# Patient Record
Sex: FEMALE | Race: WHITE | NOT HISPANIC OR LATINO | Employment: FULL TIME | URBAN - NONMETROPOLITAN AREA
[De-identification: names, ages, dates, MRNs, and addresses within clinical notes are randomized per-mention and may not be internally consistent; named-entity substitution may affect disease eponyms.]

---

## 2017-02-02 ENCOUNTER — OFFICE VISIT - GICH (OUTPATIENT)
Dept: FAMILY MEDICINE | Facility: OTHER | Age: 52
End: 2017-02-02

## 2017-02-02 ENCOUNTER — AMBULATORY - GICH (OUTPATIENT)
Dept: SCHEDULING | Facility: OTHER | Age: 52
End: 2017-02-02

## 2017-02-02 DIAGNOSIS — Z71.3 DIETARY COUNSELING AND SURVEILLANCE: ICD-10-CM

## 2017-02-02 DIAGNOSIS — E66.9 OBESITY: ICD-10-CM

## 2017-03-10 ENCOUNTER — OFFICE VISIT - GICH (OUTPATIENT)
Dept: FAMILY MEDICINE | Facility: OTHER | Age: 52
End: 2017-03-10

## 2017-03-10 DIAGNOSIS — Z71.3 DIETARY COUNSELING AND SURVEILLANCE: ICD-10-CM

## 2017-10-02 ENCOUNTER — COMMUNICATION - GICH (OUTPATIENT)
Dept: FAMILY MEDICINE | Facility: OTHER | Age: 52
End: 2017-10-02

## 2017-10-02 DIAGNOSIS — Z71.3 DIETARY COUNSELING AND SURVEILLANCE: ICD-10-CM

## 2017-11-03 ENCOUNTER — HOSPITAL ENCOUNTER (OUTPATIENT)
Dept: RADIOLOGY | Facility: OTHER | Age: 52
End: 2017-11-03
Attending: FAMILY MEDICINE

## 2017-11-03 ENCOUNTER — HISTORY (OUTPATIENT)
Dept: RADIOLOGY | Facility: OTHER | Age: 52
End: 2017-11-03

## 2017-11-03 ENCOUNTER — AMBULATORY - GICH (OUTPATIENT)
Dept: FAMILY MEDICINE | Facility: OTHER | Age: 52
End: 2017-11-03

## 2017-11-03 DIAGNOSIS — L72.9 FOLLICULAR CYST OF SKIN AND SUBCUTANEOUS TISSUE: ICD-10-CM

## 2017-11-03 DIAGNOSIS — Z12.31 ENCOUNTER FOR SCREENING MAMMOGRAM FOR MALIGNANT NEOPLASM OF BREAST: ICD-10-CM

## 2017-11-06 ENCOUNTER — AMBULATORY - GICH (OUTPATIENT)
Dept: RADIOLOGY | Facility: OTHER | Age: 52
End: 2017-11-06

## 2017-11-06 DIAGNOSIS — R92.8 OTHER ABNORMAL AND INCONCLUSIVE FINDINGS ON DIAGNOSTIC IMAGING OF BREAST: ICD-10-CM

## 2017-11-10 ENCOUNTER — HOSPITAL ENCOUNTER (OUTPATIENT)
Dept: RADIOLOGY | Facility: OTHER | Age: 52
End: 2017-11-10
Attending: FAMILY MEDICINE

## 2017-11-10 DIAGNOSIS — R92.8 OTHER ABNORMAL AND INCONCLUSIVE FINDINGS ON DIAGNOSTIC IMAGING OF BREAST: ICD-10-CM

## 2017-12-27 NOTE — PROGRESS NOTES
Patient Information     Patient Name MRN Sex Bertha Alston [id number] Female 1965      Progress Notes by Meka Vilchis R.T. (ARRT) at 11/10/2017 12:25 PM     Author:  Meka Vilchis R.T. (ARRT) Service:  (none) Author Type:  (none)     Filed:  11/10/2017 12:26 PM Date of Service:  11/10/2017 12:25 PM Status:  Signed     :  Meka Vilchis R.T. (MELANIET) (Formerly [facility] - Registered Radiologic Technologist)            Falls Risk Criteria:    Age 65 and older or under age 4        Sensory deficits    Poor vision    Use of ambulatory aides    Impaired judgment    Unable to walk independently    Meets High Risk criteria for falls:  no
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
## 2017-12-28 NOTE — PROGRESS NOTES
Patient Information     Patient Name MRN Sex Bertha Alston [id number] Female 1965      Progress Notes by Cathi Benson at 11/3/2017 11:16 AM     Author:  Cathi Benson Service:  (none) Author Type:  (none)     Filed:  11/3/2017 11:16 AM Date of Service:  11/3/2017 11:16 AM Status:  Signed     :  Cathi Benson            Falls Risk Criteria:    Age 65 and older or under age 4        Sensory deficits    Poor vision    Use of ambulatory aides    Impaired judgment    Unable to walk independently    Meets High Risk criteria for falls:  no

## 2017-12-30 NOTE — NURSING NOTE
Patient Information     Patient Name MRN Bertha Quinones [id number] Female 1965      Nursing Note by Colette Ngo at 11/3/2017  3:45 PM     Author:  Colette Ngo Service:  (none) Author Type:  (none)     Filed:  11/3/2017  4:06 PM Encounter Date:  11/3/2017 Status:  Signed     :  Colette Ngo            Check lesion on her Rt shoulder, bleeds, hurts  Colette Ngo ....................  11/3/2017   3:47 PM

## 2018-01-03 NOTE — PROGRESS NOTES
Patient Information     Patient Name MRN Sex Bertha Alston [id number] Female 1965      Progress Notes by Dione Yañez MD at 2017  1:00 PM     Author:  Dione Yañez MD Service:  (none) Author Type:  Physician     Filed:  2017  1:33 PM Encounter Date:  2017 Status:  Signed     :  Dione Yañez MD (Physician)            SUBJECTIVE:    Bertha Brand is a 51 y.o. female who presents to discuss weight loss and would like to try the medication Contrave. She has pursued weight loss by exercising and trying to alter her diet as well as consideration of lap band which she likely would meet criteria but does not want to go the surgical route. She has a friend who has tried this medication over the last 3 months has lost about 30 pounds. The statistics for this drug long-term for maintaining loss are not very good but she would like to at least try to get started. Side effect profile has been reviewed by the patient already. She has no seizure history. All other parameters are up-to-date.    HPI    No Known Allergies,   Current Outpatient Prescriptions:      naltrexone-bupropion 8-90 mg (CONTRAVE) 8-90 mg Extended-Release tablet, Start with 1 tablet by mouth in am for one week, then 1 tablet twice daily for 1 week, then 2 tablets am and 1 pm for 1 week., Disp: 42 tablet, Rfl: 0     naltrexone-bupropion 8-90 mg (CONTRAVE) 8-90 mg Extended-Release tablet, Take 2 tablets by mouth 2 times daily., Disp: 120 tablet, Rfl: 0  Medications have been reviewed by me and are current to the best of my knowledge and ability.,   Patient Active Problem List      Diagnosis Date Noted     Obesity 2011    and   Social History       Substance Use Topics         Smoking status:   Never Smoker     Smokeless tobacco:   Not on file     Alcohol use   0.6 oz/week      1 Standard drinks or equivalent per week         Comment: About once a week.        REVIEW OF SYSTEMS:  ROS    OBJECTIVE:  /70   "Pulse 70  Ht 1.7 m (5' 6.93\")  Wt 112.5 kg (248 lb)  BMI 38.92 kg/m2  Wt Readings from Last 5 Encounters:    02/02/17 112.5 kg (248 lb)   07/27/16 114.3 kg (252 lb)   07/12/16 114.5 kg (252 lb 6.4 oz)   11/25/15 113.7 kg (250 lb 9.6 oz)   10/15/14 110.4 kg (243 lb 6.4 oz)      EXAM:   Physical Exam    ASSESSMENT/PLAN:    ICD-10-CM    1. Weight loss counseling, encounter for Z71.3 naltrexone-bupropion 8-90 mg (CONTRAVE) 8-90 mg Extended-Release tablet      naltrexone-bupropion 8-90 mg (CONTRAVE) 8-90 mg Extended-Release tablet   2. Obesity (BMI 30-39.9) E66.9         Plan:    Discussed how to start the medication and to slowly go up on dosing as indicated by the drug company recommendations.  Her insurance will likely not cover this medication and she does know there is online coupons to help with cost.  Follow-up in 4 weeks.  Initiate exercise and also try to keep a diet log it might be helpful to actually write down what you eat.  Dione Yañez MD  1:33 PM 2/2/2017           "

## 2018-01-03 NOTE — PATIENT INSTRUCTIONS
Patient Information     Patient Name MRN Bertha Quinones [id number] Female 1965      Patient Instructions by Dione Yañez MD at 2017  1:26 PM     Author:  Dione Yañez MD  Service:  (none) Author Type:  Physician     Filed:  2017  1:26 PM  Encounter Date:  2017 Status:  Addendum     :  Dione Yañez MD (Physician)        Related Notes: Original Note by Dione Yañez MD (Physician) filed at 2017  1:26 PM               Index   Bupropion/Naltrexone, Oral   [id number]  ________________________________________________________________________  KEY POINTS    This medicine is taken by mouth to help people lose weight and keep the weight off. It is used along with exercise and a reduced calorie diet. Take the medicine exactly as directed.    This medicine may increase suicidal thoughts or actions in some people    This medicine may cause unwanted side effects. Tell your healthcare provider if you have any side effects that are serious, continue, or get worse.    Tell all healthcare providers who treat you about all the prescription medicines, nonprescription medicines, supplements, natural remedies, and vitamins that you take.  ________________________________________________________________________  What are other names for this medicine?   Type of medicine: antidepressant/narcotic antagonist  Generic and brand names: bupropion/naltrexone, oral; Contrave  What is this medicine used for?  This medicine is taken by mouth to help people lose weight and keep the weight off. It is used along with exercise and a reduced calorie diet.  This medicine may be used to treat other conditions as determined by your healthcare provider.  What should my healthcare provider know before I take this medicine?   Do not take this medicine if you:    Have ever had a seizure disorder    Have or have had an eating disorder such as bulimia or anorexia nervosa    Are  taking any other medicine that contains bupropion (Do not take this medicine if you are also taking Aplenzin, Forfivo XL, Wellbutrin, Wellbutrin XL, or Wellbutrin SR, Buproban, or Zyban. They contain one of the same ingredients as this medicine, and increase the risk of an overdose or seizures.)    Are taking an MAO inhibitor medicine (Do not take this medicine and an MAO inhibitor within 14 days of each other.)    Take narcotic pain medicines, have recently stopped taking narcotics, or use medicines such as methadone or buprenorphine to help stop taking narcotics    Drink a lot of alcohol and suddenly stop drinking    Have been taking sedatives, benzodiazepines, stimulants, or antiseizure medicines and suddenly stop taking them  Before taking this medicine, tell your healthcare provider if you have ever had:    An allergic reaction to any medicine    A brain tumor or infection, or if you have recently had a head injury    A stroke or transient ischemic attack (TIA)    Diabetes    Glaucoma    Heart disease or a heart attack    High blood pressure    Kidney or liver disease    Low levels of sodium in your blood    Mental health problems such as depression, bipolar disorder, or thoughts of suicide    Problems with alcohol or drug abuse  Females of childbearing age: Do not take this medicine if you are pregnant. This medicine has been reported to cause birth defects. Stop taking this medicine at the first sign that you may be pregnant and contact your healthcare provider right away. Do not breast-feed while taking this medicine.   How do I take it?   Read the Medication Guide that comes in the medicine package when you start taking this medicine and each time you get a refill.  Take this medicine exactly as your healthcare provider prescribes. Your provider will change your dose if needed. Do not change your dose without your provider s approval. Do not take more or less or take it longer than prescribed. Follow the  diet and exercise program recommended by your healthcare provider.  This medicine is not approved for use in children. Check with your healthcare provider before using this medicine in children under age 18.  Do not take this medicine with high-fat meals. It may increase your risk of seizures. Do not cut, crush, or chew the tablets. Swallow them whole.  What if I miss a dose?  If you miss a dose, skip the missed dose and take the next one as directed. Do not take double doses. If you are not sure of what to do if you miss a dose, or if you miss more than one dose, contact your healthcare provider.  What if I overdose?  If you or anyone else has intentionally taken too much of this medicine, call 911 or go to the emergency room right away. If you pass out, have seizures, weakness or confusion, or have trouble breathing, call 911. If you think that you or anyone else may have taken too much of this medicine, call the poison control center. Do this even if there are no signs of discomfort or poisoning. The poison control center number is [phone].  Symptoms of an acute overdose may include: dizziness, vomiting, seizures, tremors, hallucinations (seeing or hearing things that are not there), muscle stiffness, fainting, fast or irregular heartbeat, trouble breathing, coma.  What should I watch out for?   This medicine contains bupropion, an ingredient used to treat depression or help people quit smoking. Antidepressant medicines may increase suicidal thoughts or actions within the first few months of treatment, especially in children, teens, and young adults. Some people have had changes in mood or behavior such as becoming irritable or anxious while taking this medicine to help quit smoking. These symptoms can start during treatment or after you stop treatment with this medicine. Call your healthcare provider right away if you or your family members notice any of these symptoms.  Do not take this medicine if you are  also taking Aplenzin, Forfivo XL, Wellbutrin, Wellbutrin XL, or Wellbutrin SR, Buproban, or Zyban. They contain one of the same ingredients as this medicine, and increase the risk of an overdose or seizures.  You must not take any kind of narcotic for at least 7 to 10 days before starting this medicine. This includes street drugs, prescription pain medicines, cough, cold, or diarrhea medicines, buprenorphine, or methadone. Using narcotics in the 7 to 10 days before you start taking this medicine may cause you to suddenly have symptoms of withdrawal when you take it. This medicine may affect how narcotics work, even after you ve stopped taking it. Talk with your healthcare provider about this.  You need regular checkups to see how this medicine affects you. Keep all appointments.  This medicine may trigger angle-closure glaucoma. Contact your provider right away if you have eye pain, vision changes, or redness and swelling in or around your eye.  This medicine increases the effects of alcohol and other drugs that slow down your nervous system. Do not drink alcohol or take other medicines unless your healthcare provider approves. If you drink a lot of alcohol, talk with your healthcare provider before suddenly stopping. If you suddenly stop drinking alcohol, you may increase your chance of having a seizure.  This medicine may make you drowsy or dizzy. Do not drive or operate machinery unless you are fully alert.  Adults over the age of 65 may be at greater risk for side effects. Talk with your healthcare provider about this.   If you need emergency care, surgery, lab tests, or dental work, tell the healthcare provider or dentist you are taking this medicine. This medicine may affect the results of certain drug tests.  If you have diabetes: Weight loss can cause low blood sugar, especially if you are also taking medicines to treat diabetes. This may change the amount of diabetes medicines you may need. Talk with your  healthcare provider about this.  What are the possible side effects?   Along with its needed effects, your medicine may cause some unwanted side effects. Some side effects may be very serious. Some side effects may go away as your body adjusts to the medicine. Tell your healthcare provider if you have any side effects that continue or get worse.  Life-threatening (Report these to your healthcare provider right away. If you cannot reach your healthcare provider right away, get emergency medical care or call 911 for help): Allergic reaction (hives; itching; rash; trouble breathing; tightness in your chest; swelling of your lips, tongue, and throat).  Serious (report these to your healthcare provider right away): Seizures; fast or irregular heartbeat; chest pain; severe dizziness or fainting; thoughts of suicide; changes in mood or behavior; new or worsening depression; hallucinations; confusion; yellowing of eyes or skin; dark urine; light-colored bowel movements; severe headache; eye pain, swelling, or redness; vision changes; slow or shallow breathing; severe drowsiness; severe skin redness, blisters, or peeling; unexplained muscle or joint pain; fever with a rash; tremors.  Other: Trouble sleeping, abnormal dreams, dry mouth, mild anxiety, dizziness, mild headache, constipation, diarrhea, change in sense of taste, loss of appetite, ringing in the ears, cough, runny nose, nausea, vomiting, stomach pain.  What products might interact with this medicine?   When you take this medicine with other medicines, it can change the way this or any of the other medicines work. Nonprescription medicines, vitamins, natural remedies, and certain foods may also interact. Using these products together might cause harmful side effects. Talk to your healthcare provider if you are taking:    Amantadine (Symmetrel)    Amiloride    Antianxiety medicines such as alprazolam (Xanax), clonazepam (Klonopin), clorazepate (Gen-Xene, Tranxene),  diazepam (Valium), lorazepam (Ativan), and oxazepam    Antidepressants such as amitriptyline, citalopram (Celexa), clomipramine (Anafranil), desipramine (Norpramin), duloxetine (Cymbalta), escitalopram (Lexapro), fluoxetine (Prozac), fluvoxamine (Luvox), imipramine (Tofranil), nefazodone, nortriptyline (Pamelor), sertraline (Zoloft), trazodone, venlafaxine (Effexor), and vortioxetine (Brintellix)    Antihistamines such as azelastine (Astelin, Astepro), chlorpheniramine (Chlor-Trimeton), diphenhydramine (Benadryl), and hydroxyzine (Vistaril)    Antipsychotic medicines such as aripiprazole (Abilify), asenapine (Saphris), brexpiprazole (Rexulti), chlorpromazine, haloperidol (Haldol), olanzapine (Zyprexa), perphenazine, pimozide (Orap), prochlorperazine (Compro), risperidone (Risperdal), thioridazine, trifluoperazine, and ziprasidone (Geodon)    Antiseizure medicines such as carbamazepine (Carbatrol, Epitol, Equetro, Tegretol), fosphenytoin (Cerebyx), phenytoin (Dilantin, Phenytek), primidone (Mysoline), and valproic acid (Depacon, Depakene, Depakote)    Antiviral medicines such as ombitasvir/paritaprevir/ritonavir (Technivie) and ombitasvir/paritaprevir/ritonavir/dasabuvir (Viekira)    Atomoxetine (Strattera)    Atropine/diphenoxylate (Lomotil)    Barbiturates such as butabarbital (Butisol), pentobarbital (Nembutal), phenobarbital, and secobarbital (Seconal)    Beta blockers such as betaxolol, carvedilol (Coreg), metoprolol (Lopressor, Toprol), nebivolol (Bystolic), and pindolol    Bupropion (Aplenzin, Forfivo, Wellbutrin, Buproban, Zyban)    Cancer medicines such as crizotinib (Xalkori), cyclophosphamide (Cytoxan), doxorubicin (Doxil), oxaliplatin (Eloxatin), tamoxifen, and thiotepa    Corticosteroids such as betamethasone, cortisone, dexamethasone, fludrocortisone, hydrocortisone (A-Hydrocort, Cortef), methylprednisolone (Medrol, Solu-Medrol), prednisolone (Omnipred, Orapred, Prelone), prednisone (Prednisone  Intensol), and triamcinolone (Aristospan, Kenalog)    Dextromethorphan, an ingredient in many cough, cold, or allergy medicines such as Robitussin-DM    Diabetes medicines such as glyburide (DiaBeta, Glynase) and metformin (Fortamet, Glucophage, Riomet)    Disulfiram (Antabuse)    Doxepin (Silenor)    Eliglustat (Cerdelga)    Heart medicines such as flecainide, mexiletine, procainamide, and propafenone (Rythmol)    HIV medicines such as cobicistat (Tybost), delavirdine (Rescriptor), efavirenz (Sustiva), elvitegravir/cobicistat/emtricitabine/tenofovir (Stribild), lopinavir/ritonavir (Kaletra), nevirapine (Viramune), and ritonavir (Norvir)    Linezolid (Zyvox)    Lorcaserin (Belviq)    Malaria medicines such as artemether/lumefantrine (Coartem), chloroquine, mefloquine, primaquine, and quinine    MAO inhibitors such as isocarboxazid (Marplan), phenelzine (Nardil), selegiline (Eldepryl, Emsam, Zelapar), and tranylcypromine (Parnate) (Do not take this medicine and an MAO inhibitor within 14 days of each other.)    Medicines to block or prevent stomach acid such as cimetidine (Tagamet), famotidine (Pepcid), and ranitidine (Zantac)    Medicines to treat narcotic dependence such as buprenorphine/naloxone (Bunavil, Suboxone, Zubsolv) and naltrexone (ReVia, Vivitrol)    Medicines to treat or prevent blood clots such as clopidogrel (Plavix), and warfarin (Coumadin)    Memantine (Namenda)    Muscle relaxants such as baclofen (Gablofen, Lioresal), carisoprodol (Soma), cyclobenzaprine (Amrix, Fexmid), methocarbamol (Robaxin), orphenadrine (Norflex), and tizanidine (Zanaflex)    Narcotic cough, cold, or allergy medicines such as guaifenesin/codeine (Tussi-Organidin, Robitussin AC), hydrocodone/chlorpheniramine (Tussionex), hydrocodone/homatropine, promethazine, and promethazine with codeine (Phenergan with codeine)    Natural remedies such as gotu vee, kava, Soham's wort, SAMe, and valerian    Other nonprescription or herbal  weight-loss medicines    Pain medicines such as buprenorphine (Buprenex, Butrans), codeine, fentanyl (Abstral, Actiq, Duragesic, Fentora, Sublimaze), hydrocodone (Hysingla, Zohydro), hydromorphone (Dilaudid, Exalgo), meperidine (Demerol), methadone (Dolophine, Methadose), morphine (Tess, MS Contin), morphine/naltrexone (Embeda), oxycodone (OxyContin, Roxicodone), oxycodone/acetaminophen (Percocet, Roxicet), pentazocine (Talwin), tapentadol (Nucynta), and tramadol (ConZip, Ultram)    Parkinson s disease medicines such as levodopa/carbidopa (Duopa, Rytary, Sinemet), levodopa/carbidopa/entacapone (Stalevo), and rasagiline (Azilect)    Paroxetine (Brisdelle, Paxil, Pexeva)    Procarbazine (Matulane)    Products that contain methylene blue (Hyophen, Phosphasal, Prosed DS, Urelle, Uribel, Jaye)    Propranolol (Hemangeol, Inderal, InnoPran)    Rifampin (Rifadin)    Sleeping pills such as flurazepam, temazepam (Restoril), triazolam (Halcion), zaleplon (Sonata), and zolpidem (Ambien, Edluar, Intermezzo)    Stimulants such as dexmethylphenidate (Focalin), dextroamphetamine (Dexedrine), methamphetamine (Desoxyn), and methylphenidate (Concerta, Daytrana, Metadate, Ritalin)    Tedizolid (Sivextro)    Tetrabenazine (Xenazine)    Theophylline    Varenicline (Chantix)  Drinking alcohol while you are taking this medicine may increase its side effects. Ask your healthcare provider about this.  If you are not sure if your medicines might interact, ask your pharmacist or healthcare provider. Keep a list of all your medicines with you. List all the prescription medicines, nonprescription medicines, supplements, natural remedies, and vitamins that you take. Be sure that you tell all healthcare providers who treat you about all the products you are taking.   How should I store this medicine?   Store this medicine at room temperature. Keep the container tightly closed. Protect it from heat, high humidity, and bright light.    This  advisory includes selected information only and may not include all side effects of this medicine or interactions with other medicines. Ask your healthcare provider or pharmacist for more information or if you have any questions.  Ask your pharmacist for the best way to dispose of outdated medicine or medicine you have not used. Do not throw medicine in the trash.  Keep all medicines out of the reach of children.   Do not share medicines with other people.   Developed by [employer].  Medication Advisor 2016.2 published by [employer].  Last modified: 2016-04-01  Last reviewed: 2016-01-11  This content is reviewed periodically and is subject to change as new health information becomes available. The information is intended to inform and educate and is not a replacement for medical evaluation, advice, diagnosis or treatment by a healthcare professional.  References   Medication Advisor 2016.2 Index    Copyright   2016 [employer], a division of [employer] All rights reserved.

## 2018-01-03 NOTE — PATIENT INSTRUCTIONS
Patient Information     Patient Name MRN Bertha Quinones [id number] Female 1965      Patient Instructions by Dione Yañez MD at 3/10/2017 10:00 AM     Author:  Dione Yañez MD  Service:  (none) Author Type:  Physician     Filed:  3/10/2017 10:00 AM  Encounter Date:  3/10/2017 Status:  Addendum     :  Dione Yañez MD (Physician)        Related Notes: Original Note by Dione Yañez MD (Physician) filed at 3/10/2017 10:00 AM            [facility]: Eating Right and Getting Fit -- Even When You Don t Have Time     Eating Right and Getting Fit  Eating well-balanced meals and getting regular physical activity can help you reduce your risk of certain diseases and increase your energy level. But if you are too busy, trying to eat right and be active may seem like impossible goals. They re not!  Making small changes that fit your lifestyle can help you slowly improve your diet and fitness. Finding your balance between smart food choices and physical activity can help you feel better -- even when you don t have time.  Most of the information in this booklet is from the [place] Department of Agriculture. For complete information, visit choosemyplate.gov.  For information about your health, talk with your health care provider.  Nutrition  A healthful diet is one that:            focuses on fruit, vegetables, whole grains, and fat-free or low-fat milk            includes lean meats, poultry, fish, beans, eggs and nuts            is low in saturated fats, trans fats, cholesterol, salt and added sugars.  To help make good food choices, you should eat a variety of foods from all of the following food groups.  Grain Group  Grain products are made from wheat, rice, oats, cornmeal, barley or another cereal grain. Examples of foods in this group are bread, pasta, oatmeal, tortillas and grits. Grains are split into two groups:            whole grains  These contain the entire grain  kernel (bran, germ and endosperm). The whole grains are rich in fiber, B vitamins and iron. Examples are whole-wheat flour, bulgur, oatmeal and brown rice. Make at least half of your grains whole grains.            refined grains  These have gone through a process to remove the bran and germ. This gives the grains a fine texture but removes the fiber, iron and several B vitamins. Examples are white flour, white bread and white rice.    Most refined grains are enriched. This means some B vitamins and iron are added back in after processing.    Benefits of eating whole grains            Eating foods rich in fiber may:  -   reduce the risk of heart disease, obesity and type 2 diabetes  -   help lower cholesterol levels  -   reduce constipation  -   help you manage your weight (helps keep you feeling  full  longer)  -   help prevent neural tube defects during pregnancy.            Whole grains contain fiber, many B vitamins (such as thiamin, riboflavin, niacin and folate) and minerals (such as iron and magnesium).           The vitamins and minerals in whole grains help build red blood cells, build bones, and release energy.  Tips for eating whole grains            Try whole-wheat bread, whole-wheat pasta or brown rice instead of white bread, white pasta or white rice.            Use whole grains in mixed dishes. For instance, use barley in vegetable soups or stews and bulgur wheat in casseroles.            Use whole-grain bread or cracker crumbs in meatloaf.            Add whole-grain flour or oatmeal when making cookies.            Try a 100 percent whole-grain snack.    Amounts needed each day  The following recommendations are for adults who get less than 30 minutes of moderate physical activity a day.            Women:  -   19 to 50 years: 6 ounces  -   51 + years: 5 ounces            Men:  -   19 to 30 years: 8 ounces  -   31 to 50 years: 7 ounces  -   51 + years: 6 ounces    Serving sizes  One ounce of grains is  equal to:            1 slice of bread            1 cup of ready-to-eat cereal              cup cooked rice, pasta or cereal            five whole wheat crackers              English muffin            1 pancake 4   inches in diameter            3 cups popped popcorn            1 flour tortilla 6 inches in diameter.  Vegetable Group  Any vegetable or 100 percent vegetable juice is included in this group. Vegetables may be raw, cooked, fresh, frozen, canned or dried. They are split up into five groups:            dark green  bok catina, broccoli, beth greens, dark green leafy lettuce, kale, elsie lettuce, spinach, turnip greens            red and orange  acorn squash, butternut squash, carrots, pumpkin, red peppers, sweet potatoes, tomatoes            beans and peas  black beans, black-eyed peas, garbanzo beans, kidney beans, lentils, navy beans, borrero beans, soy beans, split peas, white beans            starchy  cassava, corn, green peas, plantains, potatoes, taro            other vegetables  artichokes, asparagus, bean sprouts, beets, brussels sprouts, cabbage, cauliflower, celery, cucumbers, eggplant, green beans, green peppers, iceberg lettuce, mushrooms, okra, onions, zucchini.    Benefits of eating vegetables  Most vegetables are low in fat and calories. Vegetables do not have cholesterol. Vegetables are a good source of potassium, fiber and vitamins A and C.            Eating a diet rich in vegetables may:  -   reduce the risk for type 2 diabetes, stroke, heart disease, obesity  -   help protect against certain cancers  -   lower blood pressure  -   help you manage your weight (helps keep you feeling  full  longer).            Vitamin A in vegetables helps keep your eyes and skin healthy.            Vitamin C in vegetables helps keep your teeth and gums healthy. It helps your body absorb iron, and helps your body heal from cuts and wounds.    Tips for eating vegetables            Buy fresh vegetables when in  season.            Stock up on frozen vegetables.            Eat vegetables as snacks.            Vary your vegetables.            Prepare more foods from fresh ingredients. If you use canned vegetables, look for cans that are labeled  reduced sodium,   low sodium  or  no salt added.             Use vegetables as main dishes.            Shred carrots or zucchini into meatloaf, casseroles, quick breads or muffins.            Add chopped vegetables to pizza or in pasta sauce.            Eat raw vegetables with low-fat salad dressing or other low-fat dip.    Amounts needed each day  The following recommendations are for adults who get less than 30 minutes of moderate physical activity a day.            Women:  -   19 to 50 years: 2   cups  -   51 + years: 2 cups            Men:  -   19 to 50 years: 3 cups  -   51 + years: 2   cups    Serving sizes  One cup of vegetables is equal to:            1 cup of raw or cooked vegetables or vegetable juice            2 cups of raw leafy greens.  Fruit Group  Any fruit or 100 percent fruit juice is included in this group. Fruits may be fresh, frozen, canned or dried.  Benefits of eating fruits  Most fruits are low in fat, sodium and calories. They do not have cholesterol. Fruits are rich in potassium, fiber, vitamin C and folate (folic acid).            Eating a diet rich in fruits may:  -   reduce the risk for type 2 diabetes, stroke, heart disease, obesity  -   help protect against certain cancers  -   lower blood pressure  -   help you manage your weight (helps keep you feeling  full  longer).  -   help lower your cholesterol.    Tips for eating fruits            Keep a bowl of whole fruit on the table, counter or in the refrigerator.            Buy fresh fruits in season.            Buy fruits that are dried, frozen and canned (in water or 100 percent juice).            Cut up fruit (or buy pre-cut fruit) to have on hand for snacks.            Choose fruits that are high in  potassium, such as bananas, prunes and prune juice, peaches, apricots and orange juice.            Vary your fruit choices.            Add cut-up bananas or peaches to cereal.            Spread peanut butter on apple slices.            Keep a package of dried fruit handy for snacks.    Amounts needed each day  The following recommendations are for adults who get less than 30 minutes of moderate physical activity a day.            Women:  -   19 to 30 years: 2 cups  -   31 to 51 + years: 1   cups            Men:  -   19 to 51 + years: 2 cups    Serving sizes  One cup of fruit is equal to:              cup of dried fruit            1 large banana (8 to 9 inches long)            32 seedless grapes            about eight large strawberries            1 large peach or two halves, canned            1 medium pear            1 large orange            1 small apple.  Dairy Group  Foods in the milk group are those made from milk or fluid milk products. Foods in the milk group contain calcium, potassium, vitamin D and protein. Most dairy group choices should be fat-free or low-fat.  Foods made from milk that have little to no calcium (such as cream cheese, cream and butter) are not part of this group.  Common choices in this group are:            milk            milk-based desserts (puddings, ice milk, frozen yogurt, ice cream)            calcium-fortified soymilk            cheese            yogurt    Benefits of eating/drinking dairy products  Calcium in milk and milk products helps build and   maintain bones and teeth. Foods in the dairy group also have potassium, vitamin D and protein.            Eating a diet rich in low-fat or fat-free dairy may:  -   reduce your risk of osteoporosis (weak, brittle bones)  -   reduce the risk for type 2 diabetes, stroke, heart disease.  Most milk group choices should be fat-free or low-fat. Many cheese, whole milk and products made from them are high in cholesterol. Limit the amount of  these foods you eat.  Tips for making wise choices            Include milk or calcium-fortified soymilk at meals. Choose low-fat or fat-free milk.            If you usually drink whole milk, switch to reduced fat (2 percent), then low-fat (1 percent) and then fat-free (skim).            If you have coffee drinks with milk, ask for fat-free milk.            Use fat-free or low-fat milk when making condensed cream soups.            Have fat-free or low-fat yogurt as a snack.            Make fruit-yogurt smoothies in a .            Eat cut-up fruit with flavored yogurt for a dessert.            Top a baked potato with fat-free or low-fat yogurt.    Amounts needed each day  The following recommendations are for adults who get less than 30 minutes of moderate physical activity a day.            Women:  -   19 to 51 + years: 3 cups            Men:  -   19 to 51 + years: 3 cups    Serving sizes  One cup of dairy is equal to:            1 cup of milk            1 cup (8 ounces) yogurt            1   ounces of natural cheeses            2 ounces of processed cheese              cup ricotta cheese            2 cups cottage cheese            1 cup pudding made with milk            1   cups ice cream.  Protein Group  All foods made from meat, poultry, seafood, beans and peas, eggs, processed soy products, nuts and seeds are included in the protein group. (Beans and peas are also in the vegetable group.)  Select a variety of foods from this group. Examples of foods in this group include:            meats (choose lean or low-fat meats):  beef, ham, lamb, pork, veal            poultry (choose lean or low-fat poultry):  chicken, turkey, goose and duck            beans and peas:  black beans, black-eyed peas, chickpeas, falafel, kidney beans, lentils, navy beans, borrero beans, split beans, processed soy products (tofu, bean or veggie burgers, tempeh)            nuts and seeds (choose unsalted nuts and seeds):  almonds,  cashews, hazelnuts, mixed nuts, peanuts, peanut butter, pecans, pistachios, pumpkin seeds, sunflower seeds, walnuts            seafood:  finfish (cod, fidencio, halibut, mackerel, salmon, sea bass, swordfish, trout, tuna), shellfish (clams, crab, crayfish, lobster, oysters, scallops, shrimp), canned fish (anchovies, tuna, sardines)            eggs:  chicken and duck eggs    Benefits of eating protein products  Food in the protein group provides protein, B vitamins, vitamin E, iron, zinc and magnesium. These nutrients help keep bones, muscles, cartilage, skin and blood healthy. Iron is used to carry oxygen in the blood.            Eating a diet rich in low-fat or lean proteins may reduce your risk of heart disease.            Eat at least 8 ounces of seafood each week. Seafood is rich in omega-3 fatty acids, which helps protect your heart against heart disease.  -   Follow any precautions if you have a shellfish allergy.  -   If you are pregnant, visit the [facility] Department of Health website to read the Statewide Safe Eating Guidelines (health.state.[place]/fish).            Some meats and poultry are high in cholesterol and/or saturated fat. These foods can raise your blood cholesterol level. Limit the amount of these foods you eat: fatty cuts of beef, pork and lamb; regular ground beef; sausages, hot dogs and michael; some luncheon meats (bologna and salami); duck; egg yolks; organ meats.    Tips for making wise choices            Choose lean cuts of meat, including:  -   beef: round steaks and roasts, top loin, top sirloin, norah shoulder, arm roasts, extra lean ground beef (90 to 95 percent lean)  -   pork: pork loin, tenderloin, center loin, ham  -   poultry: boneless, skinless chicken breasts and turkey cutlets.            Choose lean turkey, roast beef, ham or low-fat luncheon (deli) meats for sandwiches.            Trim fats from meat and poultry before cooking.            Broil, grill, roast, poach or boil  meat, poultry or fish.            Drain off any fat during cooking.            Prepare beans or peas without added fats.            Choose seafoods high in omega-3 fatty acids, such as salmon, trout and herring.            Choose beans, peas or soy products as a main dish or part of a meal often.            Choose unsalted nuts as a snack, on salads or in main dishes.  Amounts needed each day  The following recommendations are for adults who get less than 30 minutes of moderate physical activity a day.            Women:  -   19 to 30 years: 5   ounces  -   31 to 51 + years: 5 ounces            Men:  -   19 to 30 years: 6   ounces  -   31 to 50 years: 6 ounces  -   51 + years: 5   ounces    Serving sizes  One ounce of food from the protein group is equal to:            1 ounce of meat, poultry or fish              cup cooked beans            1 egg            1 tablespoon of peanut butter              ounce of nuts and seeds.  Oils and Liquid Fats  Oils are fats that are liquid at room temperature. They come from plants and from fish. Oils are not a food group but they are important for your overall health. Liquid, plant-based oils do not contain cholesterol.  Common oils are canola, corn, cottonseed, olive, safflower, soybean and sunflower.  Foods naturally high in oils include nuts, olives, some fish and avocados.  Foods that are mainly oil include mayonnaise, some salad dressings, and soft margarine with no trans fats. (Read food labels to find margarines that have 0 grams of trans fat.)  Most oils are high in monounsaturated or polyunsaturated fats and low in saturated fats. They also contain vitamin E. Oils from plant sources (vegetable and nut oils) do not have cholesterol.  Choose fats that have monounsaturated or polyunsaturated fats. These do not raise the LDL ( bad ) cholesterol in your blood.  Solid fats  Solid fats are solid at room temperature (like butter and shortening). They come from many animal foods  and are also made from vegetable oils through a process called hydrogenation.  Common fats include butter, cream, milk fat, tallow, chicken fat, lard, stick margarine, shortening and partially hydrogenated oil.  Most solid fats are high in saturated fats and trans fats or both. These can raise the LDL ( bad ) cholesterol levels in your blood. This increases your risk for heart disease.  Health and calorie count  Monounsaturated and polyunsaturated fats are liquid at room temperature (such as oils). Saturated fats are solid at room temperature (such as butter or stick margarine).  Liquid oils are, in general, better for your health than solid fats because they have less saturated and trans fats. Both oils and solids fat contain about 120 calories in one tablespoon.  Added Sugars  Sugars are found naturally in fruits and milk. Added sugars are sugars and syrups that are added to foods. Major sources of foods and drinks that have added sugars are:            regular soft drinks, energy drinks, sports drinks            candy            cakes            cookies            pies and cobblers            sweet rolls, pastries, doughnuts            fruit drinks            dairy desserts (such as ice cream).    To tell if a food has added sugar, look at the food label for words that include  sugar  or  -ose  at the end of a word. These words include:            brown sugar, powdered sugar, invert sugar, white granulated sugar, raw sugar            dextrose, fructose, lactose, sucrose            corn syrup            honey            maple syrup            molasses, nectars.    Empty Calories  Empty calories are calories you eat or drink from solid fats or added sugars. These foods have calories but very few nutrients.  Solid fats and added sugars can make a food or drink more tasty but they can add a lot of calories. The foods and drinks that have the most empty calories are:            cakes, cookies, pastries, doughnuts             sodas, energy drinks, sports drinks, fruit drinks            cream cheese, butter, salad dressings            ice cream            sausages, hot dogs, michael.  It is important to limit empty calories. This is based on how many calories you need each day, your gender, age and how much exercise you get.  Create New Healthful Eating Habits  It can be difficult to limit unhealthful foods, especially if you have a short lunch break or can t have a sit-down meal. Keep a record of what you eat each day for one week may give you an idea of the foods you re lacking or the foods you re eating too much.  Instead of grabbing food from vending machines or stopping at fast food restaurants, have pre-cut vegetables, fruits, lean meat sandwiches, and other foods ready to go. Pack a lunch for work the night before. Have nutritious snacks and 100 percent fruit juices available.  Don t try to make all of your changes overnight. Start with one or two areas and gradually keep making more changes. You will be more successful if you make small strides and stick with them than trying to make major changes for just a few days. You don t have to give up all of the unhealthy food, but instead of going out to a fast food restaurant three times a week, go once every two weeks. Variety will help you stay interested in continuing your plan to healthy living.  Portion Sizes  A key part of a healthy lifestyle is eating the right portion sizes. To help keep servings sizes in proportion, use smaller plates. Use the following chart for correct portion sizes.    Physical Activity  Physical activity is moving your body. For health benefits, you should be moderately or vigorously active for at least 30 minutes a day, most days of the week. These activities should increase your heart rate.  Moderate activities include:            walking briskly (about 3   miles per hour)            hiking            gardening/yard work            dancing             playing golf (walking and carrying your clubs)            bicycling (at least 10 miles per hour)            weight training (general light workout).    Vigorous physical activities include:            running/jogging (5 miles per hour)            bicycling (more than 10 miles per hour)            swimming (freestyle laps)            aerobics            heavy yard work (such as chopping wood)            weight lifting (vigorous effort)            competitive sports.    Physical activity is important to living a longer, healthier and happier life. It can:            relieve stress            give you an overall feeling of well-being            improve your self-esteem            help build and maintain bones, muscles and joints            build endurance and muscle strength            helps lower your risk of heart disease, colon cancer and type 2 diabetes            helps control blood pressure            reduces feelings of depression and anxiety.    Finding Physical Activity You Enjoy  There are three basic kinds of physical activity: aerobic activities, resistance/strength training, and balance and stretching.            Aerobic activities speed your heart rate and breathing. It helps improve your heart and lung fitness. Examples include brisk walking, jogging and swimming.            Resistance, strength training and weight-bearing activities help build and maintain bones and muscles. Examples include lifting weights and walking.            Balance and stretching activities enhance your stability and flexibility. Examples include gently stretching, dancing, yoga and the martial arts.  If you do not have a regular physical activity routine, you can start one at any time. You do not need to join health clubs or buy expensive equipment --  simply find activities you enjoy and someone to help keep you motivated!  Make physical activity a regular part of your day. You can break up your 30 minutes of physical activity  into chunks of 10 minutes. You can take a 10-minute walk in the morning, walk up and down stairs at work for 10 minutes, and do stretching for 10 minutes before bedtime.  Before you start or increase a physical activity program, or if you have a health concern, please talk with your health care provider.  How To Increase Your Physical Activity  You can fit physical activity into your everyday routine.            Use stairs instead of the escalator or elevator.            Drive less and walk or bike more often.            Park at the far end of the parking lot.            Get off the bus one stop early and walk the rest of the way.            Use a push mower to cut your grass.            Farley leaves instead of blowing them into a pile.            Push a stroller or walk the dog.            Ride a stationary bike while watching TV.            Clean the house.            Join a walking group.            Walk regularly at a local mall.            Go for bike rides with your kids.            Wash your car.            Build a snowman with your kids or dance.            Take a 10-minute break at work to go for a brisk walk.            Do water aerobics.            Build weekend family activities around physical activities.  Whatever you choose to do, make sure you re having fun while being active!  Information adapted from choose[employer]plate.gov.  The website contains tips and resources, foods to eat more and less of, and nutrition information for women who are pregnant or breastfeeding, children, and people who want to lose weight.   2012 [employer]. TM - A [employer] OF [employer]  OTHER TRADEMARKS USED ARE OWNED BY THEIR RESPECTIVE OWNERS  THIS BOOKLET DOES NOT REPLACE MEDICAL OR PROFESSIONAL ADVICE; IT IS ONLY A GUIDE  [id number] (4/12)

## 2018-01-03 NOTE — PROGRESS NOTES
Patient Information     Patient Name MRN Sex Bertha Alston [id number] Female 1965      Progress Notes by Dione Yañez MD at 3/10/2017  9:45 AM     Author:  Dione Yañez MD Service:  (none) Author Type:  Physician     Filed:  3/10/2017 10:54 AM Encounter Date:  3/10/2017 Status:  Signed     :  Dione Yañez MD (Physician)            SUBJECTIVE:    Bertha Brand is a 51 y.o. female who presents for follow up weight loss and is doing very well on medication Contrave is currently on one twice daily and thinks she would like to stay at that dosage because it seems to be working well for her. He did try going up a bit but had some side effects and would like to continue this dosage since it does induce some anorexia and she's lost 8 pounds. She is happy with results and would like to continue for the time being. She has switched a healthier snacks and is trying to be more physically active.    HPI    No Known Allergies,   Current Outpatient Prescriptions:      naltrexone-bupropion 8-90 mg (CONTRAVE) 8-90 mg Extended-Release tablet, Take 1 tablet by mouth 2 times daily., Disp: 60 tablet, Rfl: 2     naltrexone-bupropion 8-90 mg (CONTRAVE) 8-90 mg Extended-Release tablet, Start with 1 tablet by mouth in am for one week, then 1 tablet twice daily for 1 week, then 2 tablets am and 1 pm for 1 week., Disp: 42 tablet, Rfl: 0  Medications have been reviewed by me and are current to the best of my knowledge and ability.,   Patient Active Problem List      Diagnosis Date Noted     Obesity 2011    and   Social History       Substance Use Topics         Smoking status:   Never Smoker     Smokeless tobacco:   Not on file     Alcohol use   0.6 oz/week      1 Standard drinks or equivalent per week         Comment: About once a week.        REVIEW OF SYSTEMS:  ROS    OBJECTIVE:  /70  Pulse 72  Wt 108.9 kg (240 lb)  Breastfeeding? No  BMI 37.67 kg/m2  Wt Readings from Last 6 Encounters:     03/10/17 108.9 kg (240 lb)   02/02/17 112.5 kg (248 lb)   07/27/16 114.3 kg (252 lb)   07/12/16 114.5 kg (252 lb 6.4 oz)   11/25/15 113.7 kg (250 lb 9.6 oz)   10/15/14 110.4 kg (243 lb 6.4 oz)       EXAM:   Physical Exam   Constitutional: She is well-developed, well-nourished, and in no distress. No distress.   Hair and makeup done today and she is being more attentive to self-cares.   Nursing note and vitals reviewed.      ASSESSMENT/PLAN:    ICD-10-CM    1. Weight loss counseling, encounter for Z71.3 naltrexone-bupropion 8-90 mg (CONTRAVE) 8-90 mg Extended-Release tablet        Plan:    Refilled her medication for weight loss for 3 months and would plan to see her back before refills do.  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  Congratulated on her success thus far.  Dione Yañez MD  10:53 AM 3/10/2017

## 2018-01-27 VITALS
DIASTOLIC BLOOD PRESSURE: 70 MMHG | SYSTOLIC BLOOD PRESSURE: 116 MMHG | HEART RATE: 72 BPM | BODY MASS INDEX: 37.67 KG/M2 | WEIGHT: 240 LBS

## 2018-01-27 VITALS
RESPIRATION RATE: 16 BRPM | HEART RATE: 66 BPM | SYSTOLIC BLOOD PRESSURE: 126 MMHG | WEIGHT: 245.6 LBS | HEIGHT: 67 IN | BODY MASS INDEX: 38.55 KG/M2 | DIASTOLIC BLOOD PRESSURE: 66 MMHG

## 2018-01-27 VITALS
WEIGHT: 248 LBS | HEART RATE: 70 BPM | BODY MASS INDEX: 38.92 KG/M2 | SYSTOLIC BLOOD PRESSURE: 118 MMHG | DIASTOLIC BLOOD PRESSURE: 70 MMHG | HEIGHT: 67 IN

## 2018-03-25 ENCOUNTER — HEALTH MAINTENANCE LETTER (OUTPATIENT)
Age: 53
End: 2018-03-25

## 2018-04-30 ENCOUNTER — OFFICE VISIT (OUTPATIENT)
Dept: FAMILY MEDICINE | Facility: OTHER | Age: 53
End: 2018-04-30
Attending: FAMILY MEDICINE
Payer: COMMERCIAL

## 2018-04-30 VITALS
WEIGHT: 241 LBS | BODY MASS INDEX: 37.83 KG/M2 | HEART RATE: 60 BPM | HEIGHT: 67 IN | SYSTOLIC BLOOD PRESSURE: 142 MMHG | DIASTOLIC BLOOD PRESSURE: 90 MMHG

## 2018-04-30 DIAGNOSIS — Z71.3 WEIGHT LOSS COUNSELING, ENCOUNTER FOR: ICD-10-CM

## 2018-04-30 DIAGNOSIS — L98.9 SKIN LESION: Primary | ICD-10-CM

## 2018-04-30 PROCEDURE — 99213 OFFICE O/P EST LOW 20 MIN: CPT | Performed by: FAMILY MEDICINE

## 2018-04-30 NOTE — PATIENT INSTRUCTIONS
Recognizing Skin Cancer  Doing monthly skin checkups is the best way to find new marks or skin changes. During your skin checkups, be sure to follow the ABCDEs of skin checks. This means checking moles or other growths for Asymmetry, Border, Color, Diameter, and Evolving (changing). Note, too, any new growths, or, if any of your growths bleed, itch, look different, or are painful.  The ABCDEs of skin checks  Check your moles or growths for signs of melanoma using ABCDE:    Asymmetry: the sides of the mole or growth don t match    Border: the edges are ragged, notched, or blurred    Color: the color within the mole or growth varies    Diameter: the mole or growth is larger than 6 mm (size of a pencil eraser)    Evolving: the size, shape, or color of the mole or growth is changing (evolving is not shown below)       In addition to the ABCDEs, other warning signs of skin cancer include:    A spot or mole that looks different from all other marks on your skin    Changes in how an area feels, such as itching, tenderness, or pain    Changes in the skin's surface, such as oozing, bleeding, or scaliness    A sore that does not heal    New swelling or redness beyond the border of a mole  Who s at risk?  Anyone can get skin cancer. But you are at greater risk if you have:    Fair skin, light-colored hair, or light-colored eyes    Many moles or abnormal moles on your skin    A history of sunburns from sunlight or tanning beds    A family history of skin cancer    A history of exposure to radiation or chemicals    A weakened immune system  Also, a personal history of skin cancer puts you at risk for recurring skin cancer.  How to check your skin  Do your monthly skin checkups in front of a full-length mirror. Check all parts of your body, including your:    Head (ears, face, neck, and scalp)    Torso (front, back, and sides)    Arms (tops, undersides, upper, and lower armpits)    Hands (palms, backs, and fingers, including  under the nails)    Buttocks and genitals    Legs (front, back, and sides)    Feet (tops, soles, toes, including under the nails, and between toes)  If you have a lot of moles, take digital photos of them each month. Make sure to take photos both up close and from a distance. These can help you see if any moles change over time.  When to seek medical treatment  Most skin changes are not cancer. But if you see any changes in your skin, call your doctor right away. Only he or she can diagnose a problem. If you have skin cancer, seeing your doctor can be the first step toward getting the treatment that could save your life.   Date Last Reviewed: 1/1/2017 2000-2017 [employer] [address] [place] [address], [place] [address]. All rights reserved. This information is not intended as a substitute for professional medical care. Always follow your healthcare professional's instructions.

## 2018-04-30 NOTE — PROGRESS NOTES
"SUBJECTIVE:   Bertha Brand is a 52 year old female who had a lesion on right clavicular area about 2 months ago. It the appearance of a \"blood blister\" so she actually squeezed it a bit and then actually poked it with a pin.  A little bit of blood came out and outs a little bit darker in color.  It has not gotten any larger.  She does not have a lot of other skin lesions.  Her  was treated for a skin cancer last year so he told her she needed to come in and have it taken off.     She also would like to discuss going back on Contrave for weight loss.  She had been doing well and had actually lost about 25-30 pounds using the medication and then her mother was diagnosed with metastatic lung cancer and she went through treatments with her and helped her during that time.  So she got off of her regimen.  She would like to go back on the 1 twice a day dose and see if she can lose some more weight.  She is very motivated and has been increasing her activity and now doing 10,000 steps a day.    OBJECTIVE:   /90 (BP Location: Right arm, Patient Position: Sitting, Cuff Size: Adult Large)  Pulse 60  Ht 5' 7\" (1.702 m)  Wt 241 lb (109.3 kg)  BMI 37.75 kg/m2  Wt Readings from Last 3 Encounters:   04/30/18 241 lb (109.3 kg)   11/03/17 245 lb 9.6 oz (111.4 kg)   03/10/17 240 lb (108.9 kg)       Appears well, alert, oriented, pleasant and cooperative.  Inspection of right chest wall at the area of the middle clavicle with a 5 mm purply colored lesion that is very uniform in texture and margins.      ASSESSMENT:  1. Skin lesion    2. Weight loss counseling, encounter for          PLAN:   Suspect the lesion was actually a cherry angioma by description.  She has excoriated it by poking it with a needle and now it is healing.  I suggested she allow it to fully heal and then we reassess it.  She will need to come back in about 6 months to follow-up on her use of Contrave and weight loss so at that time we will check " it again.  If this lesion is enlarging or changing in any way before then she should call to have it excised.  Contrave refilled at dose of 1 twice daily.  Dione Yañez MD  2:44 PM 4/30/2018  Portions of this dictation were created using the Dragon Nuance voice recognition system. Proofreading was completed but there may be errors in text.

## 2018-04-30 NOTE — NURSING NOTE
Patient presents to clinic with a blood blister on right shoulder, has had a couple months.  Cynthia Whitehead LPN ...... 4/30/2018 2:18 PM

## 2018-04-30 NOTE — MR AVS SNAPSHOT
After Visit Summary   4/30/2018    Bertha Brnad    MRN: [id number]           Patient Information     Date Of Birth          1965        Visit Information        Provider Department      4/30/2018 2:15 PM Dione Yañez MD [facility]        Today's Diagnoses     Weight loss counseling, encounter for    -  1      Care Instructions      Recognizing Skin Cancer  Doing monthly skin checkups is the best way to find new marks or skin changes. During your skin checkups, be sure to follow the ABCDEs of skin checks. This means checking moles or other growths for Asymmetry, Border, Color, Diameter, and Evolving (changing). Note, too, any new growths, or, if any of your growths bleed, itch, look different, or are painful.  The ABCDEs of skin checks  Check your moles or growths for signs of melanoma using ABCDE:    Asymmetry: the sides of the mole or growth don t match    Border: the edges are ragged, notched, or blurred    Color: the color within the mole or growth varies    Diameter: the mole or growth is larger than 6 mm (size of a pencil eraser)    Evolving: the size, shape, or color of the mole or growth is changing (evolving is not shown below)       In addition to the ABCDEs, other warning signs of skin cancer include:    A spot or mole that looks different from all other marks on your skin    Changes in how an area feels, such as itching, tenderness, or pain    Changes in the skin's surface, such as oozing, bleeding, or scaliness    A sore that does not heal    New swelling or redness beyond the border of a mole  Who s at risk?  Anyone can get skin cancer. But you are at greater risk if you have:    Fair skin, light-colored hair, or light-colored eyes    Many moles or abnormal moles on your skin    A history of sunburns from sunlight or tanning beds    A family history of skin cancer    A history of exposure to radiation or chemicals    A weakened immune system  Also, a  personal history of skin cancer puts you at risk for recurring skin cancer.  How to check your skin  Do your monthly skin checkups in front of a full-length mirror. Check all parts of your body, including your:    Head (ears, face, neck, and scalp)    Torso (front, back, and sides)    Arms (tops, undersides, upper, and lower armpits)    Hands (palms, backs, and fingers, including under the nails)    Buttocks and genitals    Legs (front, back, and sides)    Feet (tops, soles, toes, including under the nails, and between toes)  If you have a lot of moles, take digital photos of them each month. Make sure to take photos both up close and from a distance. These can help you see if any moles change over time.  When to seek medical treatment  Most skin changes are not cancer. But if you see any changes in your skin, call your doctor right away. Only he or she can diagnose a problem. If you have skin cancer, seeing your doctor can be the first step toward getting the treatment that could save your life.   Date Last Reviewed: 1/1/2017 2000-2017 [employer]. [address] [place] [address]. All rights reserved. This information is not intended as a substitute for professional medical care. Always follow your healthcare professional's instructions.                Follow-ups after your visit        Who to contact     If you have questions or need follow up information about today's clinic visit or your schedule please contact [facility] directly at [phone].  Normal or non-critical lab and imaging results will be communicated to you by [employer], letter or phone within 4 business days after the clinic has received the results. If you do not hear from us within 7 days, please contact the clinic through [employer] or phone. If you have a critical or abnormal lab result, we will notify you by phone as soon as possible.  Submit refill requests through [employer] or call your pharmacy  "and they will forward the refill request to us. Please allow 3 business days for your refill to be completed.          Additional Information About Your Visit        [employer] Information     [employer] lets you send messages to your doctor, view your test results, renew your prescriptions, schedule appointments and more. To sign up, go to [email or web address] . Click on \"Log in\" on the left side of the screen, which will take you to the Welcome page. Then click on \"Sign up Now\" on the right side of the page.     You will be asked to enter the access code listed below, as well as some personal information. Please follow the directions to create your username and password.     Your access code is: [email or web address]  Expires: 2018  2:39 PM     Your access code will  in 90 days. If you need help or a new code, please call your [facility] clinic or [phone].        Care EveryWhere ID     This is your Care EveryWhere ID. This could be used by other organizations to access your [facility] medical records  [id number]        Your Vitals Were     Pulse Height BMI (Body Mass Index)             60 5' 7\" (1.702 m) 37.75 kg/m2          Blood Pressure from Last 3 Encounters:   18 142/90   17 126/66   03/10/17 116/70    Weight from Last 3 Encounters:   18 241 lb (109.3 kg)   17 245 lb 9.6 oz (111.4 kg)   03/10/17 240 lb (108.9 kg)              Today, you had the following     No orders found for display         Today's Medication Changes          These changes are accurate as of 18  2:39 PM.  If you have any questions, ask your nurse or doctor.               These medicines have changed or have updated prescriptions.        Dose/Directions    naltrexone-bupropion 8-90 MG per 12 hr tablet   Commonly known as:  CONTRAVE   This may have changed:    - how much to take  - how to take this  - when to take this  - additional instructions   Used for:  Weight loss counseling, encounter for   Changed by:  " Dione Yañez MD        Dose:  1 tablet   Take 1 tablet by mouth 2 times daily   Quantity:  60 tablet   Refills:  5            Where to get your medicines      These medications were sent to [facility] - [place], [place] - [address]  [address], [facility] [address]     Phone:  [phone]     naltrexone-bupropion 8-90 MG per 12 hr tablet                Primary Care Provider Office Phone # Fax #    Dione Yañez [phone]       [address]  [facility] [address]        Equal Access to Services     [facility]: Hadii aad ku hadasho Soomaali, waaxda luqadaha, qaybta kaalmada adeegyada, waxstar leroy hayaan aderakesh up . So [facility] [phone].    ATENCIÓN: Si habla español, tiene a charles disposición servicios gratuitos de asistencia lingüística. Llame al [phone].    We comply with applicable federal civil rights laws and [place] laws. We do not discriminate on the basis of race, color, national origin, age, disability, sex, sexual orientation, or gender identity.            Thank you!     Thank you for choosing [facility]  for your care. Our goal is always to provide you with excellent care. Hearing back from our patients is one way we can continue to improve our services. Please take a few minutes to complete the written survey that you may receive in the mail after your visit with us. Thank you!             Your Updated Medication List - Protect others around you: Learn how to safely use, store and throw away your medicines at www.disposemymeds.org.          This list is accurate as of 4/30/18  2:39 PM.  Always use your most recent med list.                   Brand Name Dispense Instructions for use Diagnosis    naltrexone-bupropion 8-90 MG per 12 hr tablet    CONTRAVE    60 tablet    Take 1 tablet by mouth 2 times daily    Weight loss counseling, encounter for

## 2018-05-02 ENCOUNTER — TELEPHONE (OUTPATIENT)
Dept: FAMILY MEDICINE | Facility: OTHER | Age: 53
End: 2018-05-02

## 2018-05-02 NOTE — TELEPHONE ENCOUNTER
I'm trying to get a PA electronically on naltrexone-bupropion (CONTRAVE) 8-90 MG per 12 hr tablet but it's asking me some questions that I'm not sure of the answers on.  Can you please tell me what the answers to the following questions are?  Patient's baseline weight in kg.  Patient's baseline BMI (kg/m squared).  When was therapy with this med started?  Has the patient been on a weight loss regimen of a low-calorie diet, increased physical activity, and behavioral modifications for a minimum of 6 months?  Will the patient continue with this weight loss regimen?  Did the patient achieve a weight loss of 1 pound or more per week while on a weight loss regimen prior to initiating therapy with the requested medication?  Does the patient have a documented weight-related comorbidity/risk factor/complication (e.g. Diabetes, dyslipidemia, coronary artery disease, hypertension,, asthma, metabolic syndrome, GERD, depression)?  If yes, (Please explain).  Is the comorbidity/risk factor/complication severe?

## 2018-05-02 NOTE — TELEPHONE ENCOUNTER
This patient pays cash for this medication and would not meet criteria for the PA.  No need to pursue.  Dione Yañez MD  1:51 PM 5/2/2018

## 2018-07-24 NOTE — PROGRESS NOTES
Patient Information     Patient Name  Bertha Brand MRN  [id number] Sex  Female   1965      Letter by Jocelyn Guillory MD at      Author:  Jocelyn Guillory MD Service:  (none) Author Type:  (none)    Filed:   Date of Service:   Status:  (Other)       [facility]  [address]   [place] [address]  [phone]         Bertha Brand   [address]   [facility] [address]      2017  Date of Breast Imagin2017 11:35 AM    Dear Ms. Brand:    Your recent breast imaging examination showed a finding that requires additional imaging studies for a complete evaluation. Most findings are benign (not cancer). Please call [phone] to schedule an appointment for these tests if you have not already done so.    A report of your results was sent to your health care provider(s).  Your mammogram shows that your breast tissue is dense.  Dense breast tissue is relatively common and is found in more than 40 percent of women.  However, dense breast tissue may make it harder to identify cancer through a mammogram.  It may also be related to an increased risk of breast cancer.    The results of your mammogram are given to you and your primary care provider.  This information will raise your own awareness and help you talk with your primary care provider about your screening options.  Together you can decide which options are right for you based on your mammogram results, risk factors or physical exam.    Your images will become part of your medical file here at [facility] and will be available for your continuing care. You are responsible for informing any new health care provider or breast imaging facility of the date and location of this examination.    Although mammography is the most accurate method for early detection, not all cancers are found through mammography. If you notice any new changes in your breast(s) please inform your health care provider without  delay.    Thank you for choosing [facility] to participate in your healthcare needs.       [facility] Recommendations for Early Breast Cancer Detection   in Women without Symptoms  When to start having mammograms to screen for breast cancer, and how often to have them, is a personal decision. It should be based on your preferences, your values and your risk for developing breast cancer. [facility] recommends that you and your health care provider together determine when mammograms are right for you.    [facility] recommends the following guidelines for women who have an average risk for breast cancer, based on American Cancer Society guidelines:    Age 40 to 44: Mammograms are optional.     Age 45 to 54: Have a mammogram every year.    Age 55 and older: Have a mammogram every year, or transition to having one every 2 years. Continue to have mammograms as long as your health is good.    If you have a higher than average risk for breast cancer, your health care provider may recommend a different schedule.

## 2018-07-24 NOTE — PROGRESS NOTES
Patient Information     Patient Name  Bertha Brand MRN  [id number] Sex  Female   1965      Letter by Jocelyn Guillory MD at      Author:  Jocelyn Guillory MD Service:  (none) Author Type:  (none)    Filed:   Date of Service:   Status:  (Other)       [facility]  [address] [facility] [address]  [phone]         Bertah Brand   [address] [facility] [address]      2017  Date of Breast Imagin/10/2017 12:38 PM    Dear Ms. Brand:  We are pleased to inform you that the result of your recent breast imaging examination is normal/benign (not cancer). A report of your results was sent to your health care provider(s).    Your mammogram shows that your breast tissue is dense.  Dense breast tissue is relatively common and is found in more than 50 percent of women. Dense breast tissue may be associated with a slight increased risk of breast cancer and may make cancer more difficult to detect by mammogram. However, the actual risk of breast cancer for women with dense breast tissue is still low. This information is given to you to raise your own awareness and help you talk with your primary care provider. Together, you can decide which screening options are right for you.     Your images will become part of your medical file here at [facility] and will be available for your continuing care. You are responsible for informing any new health care provider or breast imaging facility of the date and location of this examination.    Mammography remains the gold standard and is the most accurate method for early detection. Mammograms are the only medical imaging test shown to reduce breast cancer deaths. Not all cancers are found through mammography. If you notice any new changes in your breast(s) please inform your healthcare provider.     Thank you for choosing [facility] to participate in your healthcare needs.     [facility] Recommendations for Early Breast Cancer Detection   in Women without Symptoms  When to start having mammograms to screen for breast cancer, and how often to have them, is a personal decision. It should be based on your preferences, your values and your risk for developing breast cancer. [facility] recommends that you and your health care provider together determine when mammograms are right for you.    [facility] recommends the following guidelines for women who have an average risk for breast cancer, based on American Cancer Society guidelines:    Age 40 to 44: Mammograms are optional.     Age 45 to 54: Have a mammogram every year.           Age 55 and older: Have a mammogram every year, or transition to having one every 2 years. Continue to have mammograms as long as your health is good.  If you have a higher than average risk for breast cancer, your health care provider may recommend a different schedule.

## 2018-09-18 ENCOUNTER — OFFICE VISIT (OUTPATIENT)
Dept: FAMILY MEDICINE | Facility: OTHER | Age: 53
End: 2018-09-18
Attending: FAMILY MEDICINE
Payer: COMMERCIAL

## 2018-09-18 VITALS — TEMPERATURE: 98.6 F | RESPIRATION RATE: 16 BRPM

## 2018-09-18 DIAGNOSIS — L98.9 SKIN LESION: Primary | ICD-10-CM

## 2018-09-18 PROCEDURE — 11401 EXC TR-EXT B9+MARG 0.6-1 CM: CPT | Performed by: FAMILY MEDICINE

## 2018-09-18 PROCEDURE — 88305 TISSUE EXAM BY PATHOLOGIST: CPT

## 2018-09-18 ASSESSMENT — ANXIETY QUESTIONNAIRES

## 2018-09-18 ASSESSMENT — PAIN SCALES - GENERAL: PAINLEVEL: NO PAIN (0)

## 2018-09-18 ASSESSMENT — PATIENT HEALTH QUESTIONNAIRE - PHQ9: 5. POOR APPETITE OR OVEREATING: NOT AT ALL

## 2018-09-18 NOTE — NURSING NOTE
Time out was done with name, date of birth, what is being removed and where  Colette Ngo LPN on 9/18/2018 at 3:56 PM

## 2018-09-18 NOTE — MR AVS SNAPSHOT
"              After Visit Summary   2018    Bertha Brand    MRN: [id number]           Patient Information     Date Of Birth          1965        Visit Information        Provider Department      2018 3:45 PM Dru Oro MD; [facility] [address] [facility]        Today's Diagnoses     Skin lesion    -  1       Follow-ups after your visit        Who to contact     If you have questions or need follow up information about today's clinic visit or your schedule please contact [facility] directly at [phone].  Normal or non-critical lab and imaging results will be communicated to you by MobileMDhart, letter or phone within 4 business days after the clinic has received the results. If you do not hear from us within 7 days, please contact the clinic through MobileMDhart or phone. If you have a critical or abnormal lab result, we will notify you by phone as soon as possible.  Submit refill requests through Denty's or call your pharmacy and they will forward the refill request to us. Please allow 3 business days for your refill to be completed.          Additional Information About Your Visit        MyChart Information     Denty's lets you send messages to your doctor, view your test results, renew your prescriptions, schedule appointments and more. To sign up, go to [email or web address] . Click on \"Log in\" on the left side of the screen, which will take you to the Welcome page. Then click on \"Sign up Now\" on the right side of the page.     You will be asked to enter the access code listed below, as well as some personal information. Please follow the directions to create your username and password.     Your access code is: [email or web address]  Expires: 2018  4:22 PM     Your access code will  in 90 days. If you need help or a new code, please call your [place] clinic or [phone].        Care EveryWhere ID     This is your Care EveryWhere ID. This could be used by " other organizations to access your [place] medical records  [id number]        Your Vitals Were     Temperature Respirations                98.6  F (37  C) 16           Blood Pressure from Last 3 Encounters:   04/30/18 142/90   11/03/17 126/66   03/10/17 116/70    Weight from Last 3 Encounters:   04/30/18 241 lb (109.3 kg)   11/03/17 245 lb 9.6 oz (111.4 kg)   03/10/17 240 lb (108.9 kg)              We Performed the Following     Surgical pathology exam     trunk, arms, legs        Primary Care Provider Office Phone # Fax #    Dione Nina Yañez [phone] [address] [facility] [address]        Equal Access to Services     JOESPH PERRY : Deborah enriquezo Soluisito, waaxda luqadaha, qaybta kaalmada adeegyamisti, rashawn darden. So [facility] [phone].    ATENCIÓN: Si habla español, tiene a charles disposición servicios gratuitos de asistencia lingüística. [facility] [phone].    We comply with applicable federal civil rights laws and [place] laws. We do not discriminate on the basis of race, color, national origin, age, disability, sex, sexual orientation, or gender identity.            Thank you!     Thank you for choosing [facility] AND [facility]  for your care. Our goal is always to provide you with excellent care. Hearing back from our patients is one way we can continue to improve our services. Please take a few minutes to complete the written survey that you may receive in the mail after your visit with us. Thank you!             Your Updated Medication List - Protect others around you: Learn how to safely use, store and throw away your medicines at www.disposemymeds.org.          This list is accurate as of 9/18/18  4:22 PM.  Always use your most recent med list.                   Brand Name Dispense Instructions for use Diagnosis    naltrexone-bupropion 8-90 MG per 12 hr tablet    CONTRAVE    60 tablet    Take 1 tablet by mouth 2 times  daily    Weight loss counseling, encounter for

## 2018-09-18 NOTE — PROGRESS NOTES
Subjective: Bertha Brand a 53 year old female who presents today for lesion removal. The lesion(s) is/are located on the right clavicle, number 1 and measures 0.8cm She The patient reports the lesion is bleeding often, raised and growing and denies other significant symptoms on ROS. Medications reviewed.    Pause for the cause has been completed prior to the prceedure.   1. Bertha was identified by both name and date of birth   2. The correct site was identified   3. Site was marked by provider    4. Written informed consent correct and signed or verbal authorization  to proceed was obtained   5. Verifed necessary supplies, equipment, and diagnostics are available    6. Time out was performed immediately prior to procedure    Objective: The lesion(s) is/are of the above mentioned size and location and is/are erythematous and raised, smooth surface. The area was prepped and appropriately anesthetized. Using the usual technique, full thickness elliptical excision in total was performed. An appropriate dressing was applied. The procedure was well tolerated and without complications.     Assessment: suspect a raised venous tereza or even capilary hemangioma, but cannot rule out a pyogenic granuloma.    Plan: Follow up: The specimen is labelled and sent to pathology for evaluation., Return for suture removal in 7 days.. Wound care instructions provided.  Patient was instructed to be alert for any signs of cutaneous infection.     Dru Oro MD on 9/18/2018 at 4:22 PM

## 2018-09-20 ASSESSMENT — ANXIETY QUESTIONNAIRES: GAD7 TOTAL SCORE: 0

## 2018-11-21 ENCOUNTER — HOSPITAL ENCOUNTER (OUTPATIENT)
Dept: MAMMOGRAPHY | Facility: OTHER | Age: 53
Discharge: HOME | End: 2018-11-21
Attending: FAMILY MEDICINE | Admitting: FAMILY MEDICINE
Payer: COMMERCIAL

## 2018-11-21 DIAGNOSIS — Z12.39 SCREENING BREAST EXAMINATION: ICD-10-CM

## 2018-11-21 PROCEDURE — 77063 BREAST TOMOSYNTHESIS BI: CPT

## 2019-03-22 ENCOUNTER — TELEPHONE (OUTPATIENT)
Dept: FAMILY MEDICINE | Facility: OTHER | Age: 54
End: 2019-03-22

## 2019-03-22 DIAGNOSIS — Z71.3 WEIGHT LOSS COUNSELING, ENCOUNTER FOR: ICD-10-CM

## 2019-03-22 NOTE — TELEPHONE ENCOUNTER
Received fax from [facility] that the Contrave 8-90 mg er tablets are non-formulary and therefore denied. I faxed this to [phone].  Laly Osei on 3/22/2019 at 9:08 AM

## 2019-12-04 ENCOUNTER — HOSPITAL ENCOUNTER (OUTPATIENT)
Dept: MAMMOGRAPHY | Facility: OTHER | Age: 54
Discharge: HOME | End: 2019-12-04
Attending: FAMILY MEDICINE | Admitting: FAMILY MEDICINE
Payer: COMMERCIAL

## 2019-12-04 DIAGNOSIS — Z12.31 VISIT FOR SCREENING MAMMOGRAM: ICD-10-CM

## 2019-12-04 PROCEDURE — 77063 BREAST TOMOSYNTHESIS BI: CPT

## 2020-06-15 DIAGNOSIS — M25.561 RIGHT KNEE PAIN, UNSPECIFIED CHRONICITY: Primary | ICD-10-CM

## 2020-06-22 ENCOUNTER — HOSPITAL ENCOUNTER (OUTPATIENT)
Dept: GENERAL RADIOLOGY | Facility: OTHER | Age: 55
End: 2020-06-22
Attending: ORTHOPAEDIC SURGERY
Payer: COMMERCIAL

## 2020-06-22 ENCOUNTER — OFFICE VISIT (OUTPATIENT)
Dept: ORTHOPEDICS | Facility: OTHER | Age: 55
End: 2020-06-22
Attending: ORTHOPAEDIC SURGERY
Payer: COMMERCIAL

## 2020-06-22 VITALS — SYSTOLIC BLOOD PRESSURE: 142 MMHG | HEART RATE: 60 BPM | DIASTOLIC BLOOD PRESSURE: 74 MMHG

## 2020-06-22 DIAGNOSIS — M25.561 RIGHT KNEE PAIN, UNSPECIFIED CHRONICITY: ICD-10-CM

## 2020-06-22 DIAGNOSIS — M25.561 ACUTE PAIN OF RIGHT KNEE: Primary | ICD-10-CM

## 2020-06-22 PROCEDURE — 73560 X-RAY EXAM OF KNEE 1 OR 2: CPT | Mod: LT

## 2020-06-22 PROCEDURE — 99202 OFFICE O/P NEW SF 15 MIN: CPT | Performed by: ORTHOPAEDIC SURGERY

## 2020-06-22 NOTE — PROGRESS NOTES
Patient is here for consult on her right knee pain.   Jaimee Gonzalez LPN .....................6/22/2020 1:39 PM

## 2020-06-23 NOTE — PROGRESS NOTES
Visit Date:   2020      NEW PATIENT DICTATION      CHIEF COMPLAINT:  A 54-year-old female with right knee pain.      HISTORY OF PRESENT ILLNESS:  Bertha Brand is a 54-year-old female with right knee pain.  She states that her  and her actually purchased some land recently.  They have been clearing it, clearing brush and cutting down and moving trees and as she was doing this she says that they tend to sit around the fire after a long day's work on the property, enjoying the evening and she does not oftentimes use the restroom in the camper.  Instead she will, because of the remote of the property, just squat next to the woods.  She had trouble getting up from a squatted position about 2 weeks ago and had significant pain in the knee and since that time it has been kind of catching and wanting to give out on her.  The pain is primarily on the medial side of her right knee.  It has been kind of waxing and waning depending upon what she does for activities.      PAST MEDICAL HISTORY:  Significant for obesity.        MEDICATIONS:  Takes naltrexone and bupropion for medications.      SURGICAL HISTORY:  Includes a  section, hysterectomy in the past, dental extractions.      SOCIAL HISTORY:  Negative for tobacco.  Rare alcohol.      ALLERGIES:  NEGATIVE FOR ALLERGIES.      REVIEW OF SYSTEMS:  Complete review of systems is negative other than the history of present illness and past medical history.      OBJECTIVE:  On examination today, this is a 54-year-old female in no acute distress, very pleasant on examination, morbidly obese.  She has full range of motion of her right knee.  She is stable to varus and valgus stress.  She is markedly tender to palpation about the medial joint line, nontender to palpation about the lateral joint line and nontender to palpation about the medial and lateral patellar facets.  She is otherwise neuro and vascularly intact.  She is stable to varus and valgus stress as well  as a negative Lachman's and negative posterior drawer.      IMAGING:  Review of the x-rays of the right knee shows that she has some very mild medial compartment osteoarthritis of the right knee, less so laterally and less so on the patellofemoral side.  There is good maintenance of the cartilage height within the right knee.      IMPRESSION AND PLAN:  This is a 54-year-old female who likely has a medial compartment meniscus tear.  We are going to go ahead and obtain an MRI of her knee to evaluate for that, and I will call her with the results.         MAYLIN MURCIA MD             D: 2020   T: 2020   MT: RAIZA      Name:     DANGELO MENDEZ   MRN:      [id number]        Account:      [id number]   :      1965           Visit Date:   2020      Document: [id number]

## 2020-06-25 ENCOUNTER — HOSPITAL ENCOUNTER (OUTPATIENT)
Dept: MRI IMAGING | Facility: OTHER | Age: 55
Discharge: HOME | End: 2020-06-25
Attending: ORTHOPAEDIC SURGERY | Admitting: ORTHOPAEDIC SURGERY
Payer: COMMERCIAL

## 2020-06-25 DIAGNOSIS — M25.561 ACUTE PAIN OF RIGHT KNEE: ICD-10-CM

## 2020-06-25 PROCEDURE — 73721 MRI JNT OF LWR EXTRE W/O DYE: CPT | Mod: RT

## 2020-11-18 ENCOUNTER — ALLIED HEALTH/NURSE VISIT (OUTPATIENT)
Dept: FAMILY MEDICINE | Facility: OTHER | Age: 55
End: 2020-11-18
Attending: FAMILY MEDICINE
Payer: COMMERCIAL

## 2020-11-18 DIAGNOSIS — R43.0 ANOSMIA: Primary | ICD-10-CM

## 2020-11-18 DIAGNOSIS — R43.2 AGEUSIA: ICD-10-CM

## 2020-11-18 PROCEDURE — 99207 PR NO CHARGE NURSE ONLY: CPT

## 2020-11-18 PROCEDURE — C9803 HOPD COVID-19 SPEC COLLECT: HCPCS

## 2020-11-18 PROCEDURE — U0003 INFECTIOUS AGENT DETECTION BY NUCLEIC ACID (DNA OR RNA); SEVERE ACUTE RESPIRATORY SYNDROME CORONAVIRUS 2 (SARS-COV-2) (CORONAVIRUS DISEASE [COVID-19]), AMPLIFIED PROBE TECHNIQUE, MAKING USE OF HIGH THROUGHPUT TECHNOLOGIES AS DESCRIBED BY CMS-2020-01-R: HCPCS | Mod: ZL | Performed by: FAMILY MEDICINE

## 2020-11-18 NOTE — PROGRESS NOTES
Patient is here for covid testing for loss of smell and taste.   Kathryn Chauhan LPN on 11/18/2020 at 3:26 PM

## 2020-11-20 LAB
SARS-COV-2 RNA SPEC QL NAA+PROBE: ABNORMAL
SPECIMEN SOURCE: ABNORMAL

## 2020-12-07 ENCOUNTER — HOSPITAL ENCOUNTER (OUTPATIENT)
Dept: MAMMOGRAPHY | Facility: OTHER | Age: 55
Discharge: HOME | End: 2020-12-07
Attending: FAMILY MEDICINE | Admitting: FAMILY MEDICINE
Payer: COMMERCIAL

## 2020-12-07 DIAGNOSIS — Z12.31 VISIT FOR SCREENING MAMMOGRAM: ICD-10-CM

## 2020-12-07 PROCEDURE — 77063 BREAST TOMOSYNTHESIS BI: CPT

## 2020-12-27 ENCOUNTER — HEALTH MAINTENANCE LETTER (OUTPATIENT)
Age: 55
End: 2020-12-27

## 2021-02-16 NOTE — NURSING NOTE
Patient Information     Patient Name MRN Bertha Quinones [id number] Female 1965      Nursing Note by Nessa Gunter at 3/10/2017  9:45 AM     Author:  Nessa Gunter Service:  (none) Author Type:  (none)     Filed:  3/10/2017  9:58 AM Encounter Date:  3/10/2017 Status:  Signed     :  Nessa Gunter            Follow up 1 month - weight loss  Nessa Gunter LPN........................3/10/2017  9:54 AM         
Home

## 2021-05-28 ENCOUNTER — OFFICE VISIT (OUTPATIENT)
Dept: FAMILY MEDICINE | Facility: OTHER | Age: 56
End: 2021-05-28
Attending: FAMILY MEDICINE
Payer: COMMERCIAL

## 2021-05-28 VITALS
SYSTOLIC BLOOD PRESSURE: 128 MMHG | OXYGEN SATURATION: 96 % | BODY MASS INDEX: 42.22 KG/M2 | TEMPERATURE: 98 F | HEIGHT: 67 IN | HEART RATE: 72 BPM | RESPIRATION RATE: 16 BRPM | DIASTOLIC BLOOD PRESSURE: 80 MMHG | WEIGHT: 269 LBS

## 2021-05-28 DIAGNOSIS — Z83.49 FAMILY HISTORY OF THYROID DISORDER: ICD-10-CM

## 2021-05-28 DIAGNOSIS — Z23 NEED FOR DIPHTHERIA-TETANUS-PERTUSSIS (TDAP) VACCINE: ICD-10-CM

## 2021-05-28 DIAGNOSIS — Z13.1 DIABETES MELLITUS SCREENING: ICD-10-CM

## 2021-05-28 DIAGNOSIS — Z13.220 LIPID SCREENING: ICD-10-CM

## 2021-05-28 DIAGNOSIS — Z00.00 ROUTINE HISTORY AND PHYSICAL EXAMINATION OF ADULT: Primary | ICD-10-CM

## 2021-05-28 DIAGNOSIS — Z12.11 COLON CANCER SCREENING: ICD-10-CM

## 2021-05-28 DIAGNOSIS — Z23 NEED FOR SHINGLES VACCINE: ICD-10-CM

## 2021-05-28 DIAGNOSIS — E66.01 MORBID OBESITY (H): ICD-10-CM

## 2021-05-28 LAB
ALBUMIN SERPL-MCNC: 4.3 G/DL (ref 3.5–5.7)
ALP SERPL-CCNC: 64 U/L (ref 34–104)
ALT SERPL W P-5'-P-CCNC: 21 U/L (ref 7–52)
ANION GAP SERPL CALCULATED.3IONS-SCNC: 8 MMOL/L (ref 3–14)
AST SERPL W P-5'-P-CCNC: 14 U/L (ref 13–39)
BILIRUB SERPL-MCNC: 0.3 MG/DL (ref 0.3–1)
BUN SERPL-MCNC: 16 MG/DL (ref 7–25)
CALCIUM SERPL-MCNC: 9.8 MG/DL (ref 8.6–10.3)
CHLORIDE SERPL-SCNC: 104 MMOL/L (ref 98–107)
CHOLEST SERPL-MCNC: 222 MG/DL
CO2 SERPL-SCNC: 27 MMOL/L (ref 21–31)
CREAT SERPL-MCNC: 0.74 MG/DL (ref 0.6–1.2)
GFR SERPL CREATININE-BSD FRML MDRD: 82 ML/MIN/{1.73_M2}
GLUCOSE SERPL-MCNC: 108 MG/DL (ref 70–105)
HBA1C MFR BLD: 5.8 % (ref 4–6)
HDLC SERPL-MCNC: 54 MG/DL (ref 23–92)
LDLC SERPL CALC-MCNC: 139 MG/DL
NONHDLC SERPL-MCNC: 168 MG/DL
POTASSIUM SERPL-SCNC: 4.5 MMOL/L (ref 3.5–5.1)
PROT SERPL-MCNC: 7.9 G/DL (ref 6.4–8.9)
SODIUM SERPL-SCNC: 139 MMOL/L (ref 134–144)
TRIGL SERPL-MCNC: 146 MG/DL
TSH SERPL DL<=0.05 MIU/L-ACNC: 2.64 IU/ML (ref 0.34–5.6)

## 2021-05-28 PROCEDURE — 80061 LIPID PANEL: CPT | Mod: ZL | Performed by: FAMILY MEDICINE

## 2021-05-28 PROCEDURE — 36415 COLL VENOUS BLD VENIPUNCTURE: CPT | Mod: ZL | Performed by: FAMILY MEDICINE

## 2021-05-28 PROCEDURE — 90471 IMMUNIZATION ADMIN: CPT | Performed by: FAMILY MEDICINE

## 2021-05-28 PROCEDURE — 99396 PREV VISIT EST AGE 40-64: CPT | Mod: 25 | Performed by: FAMILY MEDICINE

## 2021-05-28 PROCEDURE — 83036 HEMOGLOBIN GLYCOSYLATED A1C: CPT | Mod: ZL | Performed by: FAMILY MEDICINE

## 2021-05-28 PROCEDURE — 80053 COMPREHEN METABOLIC PANEL: CPT | Mod: ZL | Performed by: FAMILY MEDICINE

## 2021-05-28 PROCEDURE — 84443 ASSAY THYROID STIM HORMONE: CPT | Mod: ZL | Performed by: FAMILY MEDICINE

## 2021-05-28 PROCEDURE — 90715 TDAP VACCINE 7 YRS/> IM: CPT | Performed by: FAMILY MEDICINE

## 2021-05-28 ASSESSMENT — PAIN SCALES - GENERAL: PAINLEVEL: NO PAIN (0)

## 2021-05-28 ASSESSMENT — MIFFLIN-ST. JEOR: SCORE: 1852.81

## 2021-05-28 NOTE — PROGRESS NOTES
ANNUAL PHYSICAL - FEMALE  HPI: Bertha presents for a yearly exam.  Concerns include:  1. None    No LMP recorded. Patient has had a hysterectomy.   Contraception: NA  Risk for STI?: No  Last pap: 2012, normal.  Now s/p hysterectomy for menorrhagia.  Any hx of abnormal paps:  No  FH of early CA?: Lung CA in mom; breast CA in MGM  Cholesterol/DM concerns/screening: Due.  Tobacco?: No  Calcium intake: No  DEXA: NA  Last mammo: 2020; neg  Colonoscopy: 2016, normal.  10 year follow up.  Immunizations: Tdap 2011; flu recommended; Shingrix DUE; PNA @ 65/66.  Covid x 2 complete.    Patient Active Problem List   Diagnosis     Obesity     Past Medical History:   Diagnosis Date     Obesity     No Comments Provided     Personal history of other medical treatment (CODE)     G3, P3-0-0-3, 1 c section, 2 VBACs     Personal history of other medical treatment (CODE)      to  perpetuated by first .     Past Surgical History:   Procedure Laterality Date     CATARACT EXTRACTION Bilateral       SECTION N/A      EXTRACTION(S) DENTAL       HYSTERECTOMY VAGINAL N/A     menorrhagia     Social History     Socioeconomic History     Marital status:      Spouse name: Yakov     Number of children: Not on file     Years of education: Not on file     Highest education level: Not on file   Occupational History     Not on file   Social Needs     Financial resource strain: Not on file     Food insecurity     Worry: Not on file     Inability: Not on file     Transportation needs     Medical: Not on file     Non-medical: Not on file   Tobacco Use     Smoking status: Never Smoker     Smokeless tobacco: Never Used   Substance and Sexual Activity     Alcohol use: Yes     Alcohol/week: 1.0 standard drinks     Comment: Alcoholic Drinks/day: About once a week.     Drug use: Unknown     Types: Other     Comment: Drug use: No     Sexual activity: Yes     Partners: Male   Lifestyle     Physical activity      Days per week: Not on file     Minutes per session: Not on file     Stress: Not on file   Relationships     Social connections     Talks on phone: Not on file     Gets together: Not on file     Attends [employer] service: Not on file     Active member of club or organization: Not on file     Attends meetings of clubs or organizations: Not on file     Relationship status: Not on file     Intimate partner violence     Fear of current or ex partner: Not on file     Emotionally abused: Not on file     Physically abused: Not on file     Forced sexual activity: Not on file   Other Topics Concern     Not on file   Social History Narrative     to second  since 2001; currently adopting her children.  The patient's first  has given up parental rights.  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  Yakov -laid off in chas [employer]; 1990.  Sandip Child; 1991.  K[employer] Child; 1992.       Family History   Problem Relation Age of Onset     Diabetes Mother      Lung Cancer Mother      Heart Disease Father         CAD     Other - See Comments Father         hx of back surgery     Breast Cancer Maternal Grandmother      Thyroid Disease Maternal Grandmother         hypothyroidism     Other - See Comments Paternal Grandmother         alzheimers     Family History Negative Sister      Family History Negative Brother        Current Outpatient Medications   Medication Sig Dispense Refill     naltrexone-bupropion (CONTRAVE) 8-90 MG per 12 hr tablet Take 1 tablet by mouth 2 times daily 60 tablet 5        REVIEW OF SYSTEMS:  Refer to HPI; all other systems reviewed and negative.    PHYSICAL EXAM:  There were no vitals taken for this visit.  CONSTITUTIONAL:  Alert, cooperative, NAD.  EYES: No scleral icterus.  PERRLA.  Conjunctiva clear.  ENT/MOUTH: External ears and nose normal.  TMs normal.  Moist mucous membranes. Oropharynx clear.    ENDO: No  thyromegaly or thyroid nodules.  LYMPH:  No cervical or supraclavicular LA.    BREASTS: No skin abnormalities, no erythema.  No discrete masses.  No nipple discharge, no axillary, supra- or infraclavicular LA.   CARDIOVASCULAR: Regular, S1, S2.  No S3 or S4.  No murmur/gallop/rub.  No peripheral edema.  RESPIRATORY: CTA bilaterally, no wheezes, rhonchi or rales.  GI: Bowel sounds wnl.  Soft, nontender, non-distended.  No masses or HSM.  No rebound or guarding.  MSKEL: Grossly normal ROM.  No clubbing.  INTEGUMENTARY:Warm, dry.  No rash noted on exposed skin.  NEUROLOGIC: Facies symmetric.  Grossly normal movement and tone.  No tremor.  PSYCHIATRIC: Affect normal.  Speech fluent.      PHQ 7/12/2016   PHQ-9 Total Score 4   Q9: Thoughts of better off dead/self-harm past 2 weeks Not at all     WESTLEY-7 SCORE 9/18/2018   Total Score 0     No results found for any visits on 05/28/21.    ASSESSMENT/PLAN:  1. Routine history and physical examination of adult  - Lipid Panel; Future  - Lipid Panel    2. Colon cancer screening  Not needed at this time.    3. Lipid screening  - Lipid Panel; Future  - Lipid Panel    4. Diabetes mellitus screening  - Hemoglobin A1c; Future  - Comprehensive Metabolic Panel; Future  - Comprehensive Metabolic Panel  - Hemoglobin A1c    5. Need for shingles vaccine  --Given information about and will consider receiving at her pharmacy.    6. Need for diphtheria-tetanus-pertussis (Tdap) vaccine  Updated in the office today.  - TDAP VACCINE (Adacel, Boostrix)  [[id number]]    7. Family history of thyroid disorder  Screening lab ordered in addition to lipids.  - Thyrotropin GH; Future  - Thyrotropin GH    8. Morbid obesity (H)  Weight reduction encouraged.    Relevant cancer screening discussed.    Counseled on healthy diet, Calcium and vitamin D intake, and exercise.    Yashira Mcguire, DO  Family Practice

## 2021-05-28 NOTE — NURSING NOTE
"Chief Complaint   Patient presents with     Physical       Initial /80   Pulse 72   Temp 98  F (36.7  C) (Tympanic)   Resp 16   Ht 1.702 m (5' 7\")   Wt 122 kg (269 lb)   SpO2 96%   BMI 42.13 kg/m   Estimated body mass index is 42.13 kg/m  as calculated from the following:    Height as of this encounter: 1.702 m (5' 7\").    Weight as of this encounter: 122 kg (269 lb).  Medication Reconciliation: complete    Oliva Salazar LPN  "

## 2021-05-28 NOTE — PATIENT INSTRUCTIONS
Patient Education     Prevention Guidelines, Women Ages 50 to 64  Screening tests and vaccines are an important part of managing your health. A screening test is done to find possible disorders or diseases in people who don't have any symptoms. The goal is to find a disease early so lifestyle changes can be made and you can be watched more closely to reduce the risk of disease, or to detect it early enough to treat it most effectively. Screening tests are not considered diagnostic, but are used to determine if more testing is needed. Health counseling is essential, too. Below are guidelines for these, for women ages 50 to 64. Keep in mind that screening advice varies among expert groups. Talk with your healthcare provider about which tests are best for you and to make sure you re up to date on what you need.   Screening  Who needs it  How often    Type 2 diabetes or prediabetes  All women beginning at age 45 and women without symptoms at any age who are overweight or obese and have 1 or more additional risk factors for diabetes.  At  least every 3 years    Type 2 diabetes or prediabetes  All women diagnosed with gestational diabetes  Lifelong testing at least every 3 years    Type 2 diabetes All women with prediabetes  Every year   Unhealthy alcohol use  All women in this age group  At routine exams   Blood pressure All women in this age group  Yearly checkup if your blood pressure is normal   Normal blood pressure is less than 120/80 mm Hg   If your blood pressure reading is higher than normal, follow the advice of your healthcare provider    Breast cancer All women at average risk in this age group  Yearly mammogram should be done until age 54. At age 55, you can switch to every other year or choose to continue yearly.   All women should know how their breasts normally look and feel and know the possible benefits and risks of breast cancer screening with mammograms.    Cervical cancer All women in this age group,  except women who have had a complete hysterectomy  Pap test every 3 years or Pap test with human papillomavirus (HPV) test every 5 years    Chlamydia Women who are sexually active and at increased risk for infection  At yearly routine exams    Colorectal cancer All women at average risk in this age group  Multiple tests are available and are used at different times. Possible tests include:     Flexible sigmoidoscopy every 5 years, or    Colonoscopy every 10 years, or    CT colonography (virtual colonoscopy) every 5 years, or    Yearly fecal occult blood test, or    Yearly fecal immunochemical test every year, or    Stool DNA test, every 3 years  If you choose a test other than a colonoscopy and have an abnormal test result, you will need to follow up with a colonoscopy. Screening advice varies among expert groups. Talk with your healthcare provider about which tests are best for you.   Some people should be screened using a different schedule because of their personal or family health history. Talk with your healthcare provider about your health history.    Depression All women in this age group  At routine exams   Gonorrhea Sexually active women at increased risk for infection  At yearly routine exams    Hepatitis C Anyone at increased risk; 1 time for those born between 1945 and 1965  At routine exams   High cholesterol or triglycerides  All women in this age group who are at risk for coronary artery disease  At least every 5 years; talk with your healthcare provider about your risk    HIV All women At least once during your lifetime; yearly if at high risk    Lung cancer Women between the ages of 55 to 74 who are in fairly good health and are at higher risk for lung cancer         Currently smoke or have  quit within past 15 years         30-pack-year smoking history  , Eligibility criteria and age limit (possibly up to age 80) may vary across major organizations  Yearly lung cancer screening with a low-dose CT  scan (LDCT) Talk with your healthcare provider for more information.    Obesity All women in this age group  At yearly routine exams    Osteoporosis Women who are postmenopausal  Talk with your healthcare provider    Syphilis Women at increased risk for infection  At routine exams; talk with your healthcare provider    Tuberculosis Women at increased risk for infection  Talk with your healthcare provider    Vision All women in this age group  Talk with your healthcare provider    Vaccine Who needs it How often   Chickenpox (varicella)  All women in this age group who have no record of this infection or vaccine  2 doses; the second dose should be given at least 4 weeks after the first dose    Hepatitis A Women at increased risk for infection  2 or 3 doses (depending on the vaccine) given at least 6 months apart; talk with your healthcare provider    Hepatitis B Women at increased risk for infection  2 or 3 doses (depending on the vaccine) ; second dose should be given 1 month after the first dose; if a third dose, it should be given at least 2 months after the second dose and at least 4 months after the first dose; talk with your healthcare provider    Haemophilus influenzae Type B (HIB)  Women at increased risk for infection  1 or 3 doses; talk with your healthcare provider    Influenza (flu) All women in this age group  Once a year   Measles, mumps, rubella (MMR)  Women in this age group born in 1957 or later who have no record of these infections or vaccines  1 or 2doses   Meningococcal Women at increased risk for infection  1 or more doses; talk with your healthcare provider    Pneumococcal conjugate vaccine (PCV13) and pneumococcal polysaccharide vaccine (PPSV23)  Women at increased risk for infection  PCV13: 1 dose ages 19 to 64 (protects against 13 types of pneumococcal bacteria)   PPSV23: 1 or 2 doses through age 64(protects against 23 types of pneumococcal bacteria)   Talk with your healthcare provider    Tetanus/diphtheria/pertussis (Td/Tdap) booster All women in this age group  Td every 10 years, or a 1-time dose of Tdap instead of a Td booster after age 18, then Td every 10 years    Recombinant zoster vaccine (RZV)  All women ages 50 and older  If 2 doses; the 2nd dose is given 2 to 6 months after the first. This is given even if you've had shingles before or had a previous zoster live vaccine.    Counseling Who needs it How often   BRCA gene mutation testing for breast and ovarian cancer susceptibility  Women with increased risk for having gene mutation  When your risk is known; talk with your healthcare provider    Breast cancer and chemoprevention  Women at high risk for breast cancer  When your risk is known; talk with your healthcare provider    Diet and exercise Women who are overweight or obese  When diagnosed, and then at routine exams    Sexually transmitted infection prevention  Women at increased risk for infection  At routine exams; talk with your healthcare provider    Use of daily aspirin  Women ages 50 and up who are at high risk for cardiovascular health problems and not at increased risk for bleeding as identified by their healthcare provider  When your risk is known; talk with your healthcare provider    Use of tobacco and the health effects it can cause  All women in this age group  Every exam   [employer] last reviewed this educational content on 6/1/2020 2000-2021 [employer]. All rights reserved. This information is not intended as a substitute for professional medical care. Always follow your healthcare professional's instructions.         Patient Education     Shingrix Injection 0.1 mg/mL  Uses  For preventing viral infection.  Instructions  This medicine will be given to you at the doctor's office.  This medicine is given as an injection into a muscle.  This medicine should be given by a trained health care provider.  Please tell your doctor and pharmacist about all the  medicines you take. Include both prescription and over-the-counter medicines. Also tell them about any vitamins, herbal medicines, or anything else you take for your health.  Cautions  Tell your doctor and pharmacist if you ever had an allergic reaction to a medicine. Symptoms of an allergic reaction can include trouble breathing, skin rash, itching, swelling, or severe dizziness.  Tell the doctor or pharmacist if you are pregnant, planning to be pregnant, or breastfeeding.  Ask your pharmacist if this medicine can interact with any of your other medicines. Be sure to tell them about all the medicines you take.  Side Effects  The following is a list of some common side effects from this medicine. Please speak with your doctor about what you should do if you experience these or other side effects.    lack of energy and tiredness    fever    headaches    reaction at the area of the injection (pain, redness, swelling)    muscle pain  Call your doctor or get medical help right away if you notice any of these more serious side effects:    dizziness    ear problems (ringing in the ears, hearing loss)    fainting    blurring or changes of vision  A few people may have an allergic reactions to this medicine. Symptoms can include difficulty breathing, skin rash, itching, swelling, or severe dizziness. If you notice any of these symptoms, seek medical help quickly.  Extra  Please speak with your doctor, nurse, or pharmacist if you have any questions about this medicine.  [email or web address]  IMPORTANT NOTE: This document tells you briefly how to take your medicine, but it does not tell you all there is to know about it.Your doctor or pharmacist may give you other documents about your medicine. Please talk to them if you have any questions.Always follow their advice. There is a more complete description of this medicine available in English.Scan this code on your smartphone or tablet or use the web  address below. You can also ask your pharmacist for a printout. If you have any questions, please ask your pharmacist.     2021 [employer].

## 2021-06-16 ENCOUNTER — TELEPHONE (OUTPATIENT)
Dept: FAMILY MEDICINE | Facility: OTHER | Age: 56
End: 2021-06-16

## 2021-06-16 NOTE — TELEPHONE ENCOUNTER
Talked with patient and she is looking to see locations and phone numbers of what places to the gastric bypass sleeves. Did give her the numbers for [facility].  Oliva Salazar, MARINAN, LPN  6/16/2021  10:00 AM

## 2021-07-15 ENCOUNTER — MEDICAL CORRESPONDENCE (OUTPATIENT)
Dept: HEALTH INFORMATION MANAGEMENT | Facility: OTHER | Age: 56
End: 2021-07-15

## 2021-10-09 ENCOUNTER — HEALTH MAINTENANCE LETTER (OUTPATIENT)
Age: 56
End: 2021-10-09

## 2021-12-17 ENCOUNTER — HOSPITAL ENCOUNTER (OUTPATIENT)
Dept: MAMMOGRAPHY | Facility: OTHER | Age: 56
Discharge: HOME | End: 2021-12-17
Attending: FAMILY MEDICINE | Admitting: FAMILY MEDICINE
Payer: COMMERCIAL

## 2021-12-17 DIAGNOSIS — Z12.31 VISIT FOR SCREENING MAMMOGRAM: ICD-10-CM

## 2021-12-17 PROCEDURE — 77063 BREAST TOMOSYNTHESIS BI: CPT

## 2021-12-23 ENCOUNTER — HOSPITAL ENCOUNTER (OUTPATIENT)
Dept: ULTRASOUND IMAGING | Facility: OTHER | Age: 56
End: 2021-12-23
Attending: FAMILY MEDICINE
Payer: COMMERCIAL

## 2021-12-23 ENCOUNTER — HOSPITAL ENCOUNTER (OUTPATIENT)
Dept: MAMMOGRAPHY | Facility: OTHER | Age: 56
End: 2021-12-23
Attending: FAMILY MEDICINE
Payer: COMMERCIAL

## 2021-12-23 DIAGNOSIS — R92.8 ABNORMAL FINDING ON BREAST IMAGING: ICD-10-CM

## 2021-12-23 PROCEDURE — 77061 BREAST TOMOSYNTHESIS UNI: CPT | Mod: LT

## 2021-12-23 PROCEDURE — 76642 ULTRASOUND BREAST LIMITED: CPT | Mod: LT

## 2021-12-27 ENCOUNTER — OFFICE VISIT (OUTPATIENT)
Dept: FAMILY MEDICINE | Facility: OTHER | Age: 56
End: 2021-12-27
Attending: PHYSICIAN ASSISTANT
Payer: COMMERCIAL

## 2021-12-27 VITALS
SYSTOLIC BLOOD PRESSURE: 132 MMHG | HEIGHT: 68 IN | OXYGEN SATURATION: 97 % | HEART RATE: 68 BPM | TEMPERATURE: 98.2 F | BODY MASS INDEX: 42.34 KG/M2 | WEIGHT: 279.4 LBS | DIASTOLIC BLOOD PRESSURE: 86 MMHG | RESPIRATION RATE: 16 BRPM

## 2021-12-27 DIAGNOSIS — Z01.818 PREOP GENERAL PHYSICAL EXAM: Primary | ICD-10-CM

## 2021-12-27 DIAGNOSIS — E66.01 MORBID OBESITY (H): ICD-10-CM

## 2021-12-27 LAB — HGB BLD-MCNC: 14 G/DL (ref 11.7–15.7)

## 2021-12-27 PROCEDURE — 36415 COLL VENOUS BLD VENIPUNCTURE: CPT | Mod: ZL | Performed by: PHYSICIAN ASSISTANT

## 2021-12-27 PROCEDURE — 85018 HEMOGLOBIN: CPT | Mod: ZL | Performed by: PHYSICIAN ASSISTANT

## 2021-12-27 PROCEDURE — 93000 ELECTROCARDIOGRAM COMPLETE: CPT | Performed by: INTERNAL MEDICINE

## 2021-12-27 PROCEDURE — 99214 OFFICE O/P EST MOD 30 MIN: CPT | Performed by: PHYSICIAN ASSISTANT

## 2021-12-27 ASSESSMENT — ANXIETY QUESTIONNAIRES
2. NOT BEING ABLE TO STOP OR CONTROL WORRYING: SEVERAL DAYS
6. BECOMING EASILY ANNOYED OR IRRITABLE: SEVERAL DAYS
5. BEING SO RESTLESS THAT IT IS HARD TO SIT STILL: SEVERAL DAYS
3. WORRYING TOO MUCH ABOUT DIFFERENT THINGS: SEVERAL DAYS
1. FEELING NERVOUS, ANXIOUS, OR ON EDGE: SEVERAL DAYS
4. TROUBLE RELAXING: SEVERAL DAYS
7. FEELING AFRAID AS IF SOMETHING AWFUL MIGHT HAPPEN: SEVERAL DAYS
GAD7 TOTAL SCORE: 7
7. FEELING AFRAID AS IF SOMETHING AWFUL MIGHT HAPPEN: SEVERAL DAYS

## 2021-12-27 ASSESSMENT — PATIENT HEALTH QUESTIONNAIRE - PHQ9
SUM OF ALL RESPONSES TO PHQ QUESTIONS 1-9: 0
SUM OF ALL RESPONSES TO PHQ QUESTIONS 1-9: 0
10. IF YOU CHECKED OFF ANY PROBLEMS, HOW DIFFICULT HAVE THESE PROBLEMS MADE IT FOR YOU TO DO YOUR WORK, TAKE CARE OF THINGS AT HOME, OR GET ALONG WITH OTHER PEOPLE: NOT DIFFICULT AT ALL

## 2021-12-27 ASSESSMENT — PAIN SCALES - GENERAL: PAINLEVEL: NO PAIN (0)

## 2021-12-27 ASSESSMENT — MIFFLIN-ST. JEOR: SCORE: 1910.85
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
## 2021-12-27 NOTE — PATIENT INSTRUCTIONS
Patient should take the following medications the morning of surgery with a small sip of water: hold all meds.  Patient was instructed to hold the following medications the morning of surgery: hold all meds.     Patient was advised to call our office and the surgical services with any change in condition or new symptoms if they were to develop between today and their surgical date.  Especially any cardiopulmonary symptoms or symptoms concerning for an infection.     Discontinue aspirin, aleve, naproxen and ibuprofen 7 days prior to surgery to reduce bleeding risk.  It is fine to take tylenol the week before surgery.  Hold vitamins and herbal remedies for 7 days before surgery.    Please have a CEINT COVID-19 test on 2021.   Please stay in your car and call [phone] when you arrive.      Preparing for Your Surgery  Getting started  A nurse will call you to review your health history and instructions. They will give you an arrival time based on your scheduled surgery time. Please be ready to share:    Your doctor's clinic name and phone number    Your medical, surgical and anesthesia history    A list of allergies and sensitivities    A list of medicines, including herbal treatments and over-the-counter drugs    Whether the patient has a legal guardian (ask how to send us the papers in advance)  Please tell us if you're pregnant--or if there's any chance you might be pregnant. Some surgeries may injure a fetus (unborn baby), so they require a pregnancy test. Surgeries that are safe for a fetus don't always need a test, and you can choose whether to have one.   If you have a child who's having surgery, please ask for a copy of Preparing for Your Child's Surgery.    Preparing for surgery    Within 30 days of surgery: Have a pre-op exam (sometimes called an H&P, or History and Physical). This can be done at a clinic or pre-operative center.  ? If you're having a , you may not need this exam. Talk to  your care team.    At your pre-op exam, talk to your care team about all medicines you take. If you need to stop any medicines before surgery, ask when to start taking them again.  ? We do this for your safety. Many medicines can make you bleed too much during surgery. Some change how well surgery (anesthesia) drugs work.    Call your insurance company to let them know you're having surgery. (If you don't have insurance, call [phone].)    Call your clinic if there's any change in your health. This includes signs of a cold or flu (sore throat, runny nose, cough, rash, fever). It also includes a scrape or scratch near the surgery site.    If you have questions on the day of surgery, call your hospital or surgery center.  COVID testing  You may need to be tested for COVID-19 before having surgery. If so, we will give you instructions.  Eating and drinking guidelines  For your safety: Unless your surgeon tells you otherwise, follow the guidelines below.    Eat and drink as usual until 8 hours before surgery. After that, no food or milk.    Drink clear liquids until 2 hours before surgery. These are liquids you can see through, like water, Gatorade and Propel Water. You may also have black coffee and tea (no cream or milk).    Nothing by mouth within 2 hours of surgery. This includes gum, candy and breath mints.    If you drink alcohol: Stop drinking it the night before surgery.    If your care team tells you to take medicine on the morning of surgery, it's okay to take it with a sip of water.  Preventing infection    Shower or bathe the night before and morning of your surgery. Follow the instructions your clinic gave you. (If no instructions, use regular soap.)    Don't shave or clip hair near your surgery site. We'll remove the hair if needed.    Don't smoke or vape the morning of surgery. You may chew nicotine gum up to 2 hours before surgery. A nicotine patch is okay.  ? Note: Some surgeries require you to  completely quit smoking and nicotine. Check with your surgeon.    Your care team will make every effort to keep you safe from infection. We will:  ? Clean our hands often with soap and water (or an alcohol-based hand rub).  ? Clean the skin at your surgery site with a special soap that kills germs.  ? Give you a special gown to keep you warm. (Cold raises the risk of infection.)  ? Wear special hair covers, masks, gowns and gloves during surgery.  ? Give antibiotic medicine, if prescribed. Not all surgeries need antibiotics.  What to bring on the day of surgery    Photo ID and insurance card    Copy of your health care directive, if you have one    Glasses and hearing aides (bring cases)  ? You can't wear contacts during surgery    Inhaler and eye drops, if you use them (tell us about these when you arrive)    CPAP machine or breathing device, if you use them    A few personal items, if spending the night    If you have . . .  ? A pacemaker, ICD (cardiac defibrillator) or other implant: Bring the ID card.  ? An implanted stimulator: Bring the remote control.  ? A legal guardian: Bring a copy of the certified (court-stamped) guardianship papers.  Please remove any jewelry, including body piercings. Leave jewelry and other valuables at home.  If you're going home the day of surgery    You must have a responsible adult drive you home. They should stay with you overnight as well.    If you don't have someone to stay with you, and you aren't safe to go home alone, we may keep you overnight. Insurance often won't pay for this.  After surgery  If it's hard to control your pain or you need more pain medicine, please call your surgeon's office.  Questions?   If you have any questions for your care team, list them here: _________________________________________________________________________________________________________________________________________________________________________ ____________________________________  ____________________________________ ____________________________________  For informational purposes only. Not to replace the advice of your health care provider. Copyright   2003, 2019 [facility]. All rights reserved. Clinically reviewed by Cristy Kapadia MD. [employer] [id number] - REV 07/21.

## 2021-12-27 NOTE — NURSING NOTE
Chief Complaint   Patient presents with     Pre-Op Exam       Medication Reconciliation: complete    Meron Maza, LPN

## 2021-12-28 LAB
ATRIAL RATE - MUSE: 61 BPM
DIASTOLIC BLOOD PRESSURE - MUSE: NORMAL MMHG
INTERPRETATION ECG - MUSE: NORMAL
P AXIS - MUSE: 43 DEGREES
PR INTERVAL - MUSE: 208 MS
QRS DURATION - MUSE: 72 MS
QT - MUSE: 436 MS
QTC - MUSE: 438 MS
R AXIS - MUSE: 30 DEGREES
SYSTOLIC BLOOD PRESSURE - MUSE: NORMAL MMHG
T AXIS - MUSE: 49 DEGREES
VENTRICULAR RATE- MUSE: 61 BPM

## 2021-12-28 ASSESSMENT — PATIENT HEALTH QUESTIONNAIRE - PHQ9: SUM OF ALL RESPONSES TO PHQ QUESTIONS 1-9: 0

## 2021-12-28 ASSESSMENT — ANXIETY QUESTIONNAIRES: GAD7 TOTAL SCORE: 7

## 2021-12-30 ENCOUNTER — HOSPITAL ENCOUNTER (OUTPATIENT)
Dept: ULTRASOUND IMAGING | Facility: OTHER | Age: 56
End: 2021-12-30
Attending: FAMILY MEDICINE
Payer: COMMERCIAL

## 2021-12-30 ENCOUNTER — HOSPITAL ENCOUNTER (OUTPATIENT)
Dept: MAMMOGRAPHY | Facility: OTHER | Age: 56
End: 2021-12-30
Attending: FAMILY MEDICINE
Payer: COMMERCIAL

## 2021-12-30 VITALS — HEART RATE: 80 BPM | RESPIRATION RATE: 14 BRPM | SYSTOLIC BLOOD PRESSURE: 136 MMHG | DIASTOLIC BLOOD PRESSURE: 80 MMHG

## 2021-12-30 DIAGNOSIS — R92.8 ABNORMAL FINDING ON BREAST IMAGING: ICD-10-CM

## 2021-12-30 DIAGNOSIS — N63.20 LEFT BREAST MASS: ICD-10-CM

## 2021-12-30 PROCEDURE — 999N000065 MA POST PROCEDURE LEFT

## 2021-12-30 PROCEDURE — 250N000009 HC RX 250: Performed by: STUDENT IN AN ORGANIZED HEALTH CARE EDUCATION/TRAINING PROGRAM

## 2021-12-30 PROCEDURE — A4648 IMPLANTABLE TISSUE MARKER: HCPCS

## 2021-12-30 PROCEDURE — 88305 TISSUE EXAM BY PATHOLOGIST: CPT

## 2021-12-30 RX ORDER — LIDOCAINE HYDROCHLORIDE AND EPINEPHRINE 10; 10 MG/ML; UG/ML
10 INJECTION, SOLUTION INFILTRATION; PERINEURAL ONCE
Status: COMPLETED | OUTPATIENT
Start: 2021-12-30 | End: 2021-12-30

## 2021-12-30 RX ORDER — LIDOCAINE HYDROCHLORIDE 10 MG/ML
10 INJECTION, SOLUTION EPIDURAL; INFILTRATION; INTRACAUDAL; PERINEURAL ONCE
Status: COMPLETED | OUTPATIENT
Start: 2021-12-30 | End: 2021-12-30

## 2021-12-30 RX ADMIN — LIDOCAINE HYDROCHLORIDE 2 ML: 10 INJECTION, SOLUTION INFILTRATION; PERINEURAL at 11:43

## 2021-12-30 RX ADMIN — LIDOCAINE HYDROCHLORIDE,EPINEPHRINE BITARTRATE 6 ML: 10; .01 INJECTION, SOLUTION INFILTRATION; PERINEURAL at 11:50

## 2021-12-30 NOTE — PROGRESS NOTES
Patient here for ultrasound guided biopsy of left breast.  Procedure reviewed with patient by writer and radiologist, questions answered.  Time out performed prior to biopsy.  Biopsy completed by radiologist, clip placed.  Pressure held to biopsy site for 10 minutes.  Medipore dressing  applied.   Post clip mammogram completed.  Discharge instructions reviewed with patient, patient verbalizes understanding of instructions.  Discharged to home in stable condition with no evidence of bleeding from biopsy site.   Fatimah Castillo RN.

## 2021-12-30 NOTE — DISCHARGE INSTRUCTIONS
"NEEDLE BIOPSY BREAST    Activity: Rest the remainder of the day. You may resume normal activity after the next day. Avoid any vigorous/strenuous physical activity for 24 hours.    Comfort: If you have discomfort or tenderness at the site you may take your usual or recommended pain medication. Do not take aspirin the day of the procedure or for 48 hours following the biopsy.    Diet: You may resume your usual diet.    Care of site: Leave ice pack in place for 4 hours, or until it is no longer cold. The ice pack is reusable and may be refrozen.  Keep your bra and the dressing on for 24 hours. Then you may remove the bandage and shower. If there are steri-strips you may remove them in 3 to 5 days.  You may have some discomfort and a small amount of bruising where the biopsy was performed. This is normal. For several days or even a couple of weeks, you may have tenderness or \"twinges\" and a tiny bump where the needle went into the skin. This can be bothersome, but is not abnormal. You can use warm moist washcloths, as this may help. Do Not Use A Heating Pad.    RETURN TO THE EMERGENCY ROOM FOR:   Shortness of breath   Rapid heart rate   If pain becomes worse    Call Your Doctor For:    A fever over 101 degrees   Increased redness, increased swelling, and/or persistent drainage/discomfort  around the site    Other: At the end of your breast biopsy, a tiny titanium clip will be inserted through the biopsy needle and placed at the biopsy site within your breast. The marker provides a landmark of the biopsy for further mammograms or surgical procedures. This marker is MRI compatible and poses no known health risks.    You will be receiving a letter in the mail from [facility] Mammography Department with your biopsy results.  In 6 months a mammogram will be needed to establish a new baseline and to recheck the area where the biopsy occurred. Our radiology department will call you to schedule an appointment.    For " "questions, problems or concerns, contact the Radiology Department at [phone].    NEEDLE BIOPSY BREAST    Activity: Rest the remainder of the day. You may resume normal activity after the next day. Avoid any vigorous/strenuous physical activity for 24 hours.    Comfort: If you have discomfort or tenderness at the site you may take your usual or recommended pain medication. Do not take aspirin the day of the procedure or for 48 hours following the biopsy.    Diet: You may resume your usual diet.    Care of site: Leave ice pack in place for 4 hours, or until it is no longer cold. The ice pack is reusable and may be refrozen.  Keep your bra and the dressing on for 24 hours. Then you may remove the bandage and shower. If there are steri-strips you may remove them in 3 to 5 days.  You may have some discomfort and a small amount of bruising where the biopsy was performed. This is normal. For several days or even a couple of weeks, you may have tenderness or \"twinges\" and a tiny bump where the needle went into the skin. This can be bothersome, but is not abnormal. You can use warm moist washcloths, as this may help. Do Not Use A Heating Pad.    RETURN TO THE EMERGENCY ROOM FOR:   Shortness of breath   Rapid heart rate   If pain becomes worse    Call Your Doctor For:    A fever over 101 degrees   Increased redness, increased swelling, and/or persistent drainage/discomfort  around the site    Other: At the end of your breast biopsy, a tiny titanium clip will be inserted through the biopsy needle and placed at the biopsy site within your breast. The marker provides a landmark of the biopsy for further mammograms or surgical procedures. This marker is MRI compatible and poses no known health risks.    You will be receiving a letter in the mail from [facility] Mammography Department with your biopsy results.  In 6 months a mammogram will be needed to establish a new baseline and to recheck the area where the biopsy occurred. " Our radiology department will call you to schedule an appointment.    For questions, problems or concerns, contact the Radiology Department at [phone].

## 2021-12-31 LAB
PATH REPORT.COMMENTS IMP SPEC: NORMAL
PATH REPORT.FINAL DX SPEC: NORMAL
PHOTO IMAGE: NORMAL

## 2022-01-04 ENCOUNTER — OFFICE VISIT (OUTPATIENT)
Dept: SURGERY | Facility: OTHER | Age: 57
End: 2022-01-04
Attending: SURGERY
Payer: COMMERCIAL

## 2022-01-04 VITALS
BODY MASS INDEX: 41.94 KG/M2 | OXYGEN SATURATION: 97 % | HEART RATE: 74 BPM | DIASTOLIC BLOOD PRESSURE: 82 MMHG | WEIGHT: 275.8 LBS | SYSTOLIC BLOOD PRESSURE: 120 MMHG | TEMPERATURE: 98.3 F

## 2022-01-04 DIAGNOSIS — R92.8 ABNORMAL MAMMOGRAM OF LEFT BREAST: Primary | ICD-10-CM

## 2022-01-04 DIAGNOSIS — D24.2 FIBROADENOMA OF BREAST, LEFT: ICD-10-CM

## 2022-01-04 PROCEDURE — 99242 OFF/OP CONSLTJ NEW/EST SF 20: CPT | Performed by: SURGERY

## 2022-01-04 ASSESSMENT — PAIN SCALES - GENERAL: PAINLEVEL: NO PAIN (0)

## 2022-01-04 NOTE — NURSING NOTE
"Chief Complaint   Patient presents with     Follow Up     Left breast mass        Initial /82 (BP Location: Right arm, Patient Position: Sitting, Cuff Size: Adult Large)   Pulse 74   Temp 98.3  F (36.8  C) (Tympanic)   Wt 125.1 kg (275 lb 12.8 oz)   SpO2 97%   BMI 41.94 kg/m   Estimated body mass index is 41.94 kg/m  as calculated from the following:    Height as of 12/27/21: 1.727 m (5' 8\").    Weight as of this encounter: 125.1 kg (275 lb 12.8 oz).  Medication Reconciliation: complete      FOOD SECURITY SCREENING QUESTIONS:    The next two questions are to help us understand your food security.  If you are feeling you need any assistance in this area, we have resources available to support you today.    Hunger Vital Signs:  Within the past 12 months we worried whether our food would run out before we got money to buy more. Never  Within the past 12 months the food we bought just didn't last and we didn't have money to get more. Never        Do you have an advance care directive? No  Information given today? Declined       Chari Tee RN.......1/4/20222:10 PM   "
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
## 2022-01-05 NOTE — PATIENT INSTRUCTIONS
6 month left breast mammogram-you will get a reminder letter. Call if you think of a question or have any concerns! Good luck with your upcoming surgery!

## 2022-01-13 ENCOUNTER — TRANSFERRED RECORDS (OUTPATIENT)
Dept: HEALTH INFORMATION MANAGEMENT | Facility: OTHER | Age: 57
End: 2022-01-13
Payer: COMMERCIAL

## 2022-06-24 ENCOUNTER — HOSPITAL ENCOUNTER (OUTPATIENT)
Dept: MAMMOGRAPHY | Facility: OTHER | Age: 57
Discharge: HOME | End: 2022-06-24
Attending: SURGERY | Admitting: SURGERY
Payer: COMMERCIAL

## 2022-06-24 DIAGNOSIS — R92.8 ABNORMAL FINDING ON BREAST IMAGING: ICD-10-CM

## 2022-06-24 DIAGNOSIS — Z09 FOLLOW-UP EXAM, 3-6 MONTHS SINCE PREVIOUS EXAM: ICD-10-CM

## 2022-06-24 PROCEDURE — 77061 BREAST TOMOSYNTHESIS UNI: CPT | Mod: LT

## 2022-07-16 ENCOUNTER — HEALTH MAINTENANCE LETTER (OUTPATIENT)
Age: 57
End: 2022-07-16

## 2022-09-17 ENCOUNTER — HEALTH MAINTENANCE LETTER (OUTPATIENT)
Age: 57
End: 2022-09-17

## 2023-07-30 ENCOUNTER — HEALTH MAINTENANCE LETTER (OUTPATIENT)
Age: 58
End: 2023-07-30

## 2023-12-16 ENCOUNTER — HEALTH MAINTENANCE LETTER (OUTPATIENT)
Age: 58
End: 2023-12-16

## 2024-01-12 ENCOUNTER — HOSPITAL ENCOUNTER (OUTPATIENT)
Dept: MAMMOGRAPHY | Facility: OTHER | Age: 59
Discharge: HOME | End: 2024-01-12
Attending: FAMILY MEDICINE | Admitting: FAMILY MEDICINE
Payer: COMMERCIAL

## 2024-01-12 DIAGNOSIS — Z12.31 VISIT FOR SCREENING MAMMOGRAM: ICD-10-CM

## 2024-01-12 PROCEDURE — 77063 BREAST TOMOSYNTHESIS BI: CPT

## 2024-02-09 ENCOUNTER — OFFICE VISIT (OUTPATIENT)
Dept: FAMILY MEDICINE | Facility: OTHER | Age: 59
End: 2024-02-09
Attending: FAMILY MEDICINE
Payer: COMMERCIAL

## 2024-02-09 VITALS
WEIGHT: 212.38 LBS | RESPIRATION RATE: 16 BRPM | BODY MASS INDEX: 34.13 KG/M2 | SYSTOLIC BLOOD PRESSURE: 134 MMHG | TEMPERATURE: 96.9 F | OXYGEN SATURATION: 98 % | HEART RATE: 73 BPM | HEIGHT: 66 IN | DIASTOLIC BLOOD PRESSURE: 76 MMHG

## 2024-02-09 DIAGNOSIS — Z13.1 DIABETES MELLITUS SCREENING: ICD-10-CM

## 2024-02-09 DIAGNOSIS — Z13.220 LIPID SCREENING: ICD-10-CM

## 2024-02-09 DIAGNOSIS — Z00.00 ROUTINE HISTORY AND PHYSICAL EXAMINATION OF ADULT: Primary | ICD-10-CM

## 2024-02-09 DIAGNOSIS — Z90.3 S/P GASTRIC SLEEVE PROCEDURE: ICD-10-CM

## 2024-02-09 DIAGNOSIS — Z11.59 ENCOUNTER FOR HEPATITIS C SCREENING TEST FOR LOW RISK PATIENT: ICD-10-CM

## 2024-02-09 DIAGNOSIS — Z11.4 SCREENING FOR HIV WITHOUT PRESENCE OF RISK FACTORS: ICD-10-CM

## 2024-02-09 LAB
ALBUMIN SERPL BCG-MCNC: 4.4 G/DL (ref 3.5–5.2)
ALP SERPL-CCNC: 72 U/L (ref 40–150)
ALT SERPL W P-5'-P-CCNC: 20 U/L (ref 0–50)
ANION GAP SERPL CALCULATED.3IONS-SCNC: 8 MMOL/L (ref 7–15)
AST SERPL W P-5'-P-CCNC: 18 U/L (ref 0–45)
BASOPHILS # BLD AUTO: 0.1 10E3/UL (ref 0–0.2)
BASOPHILS NFR BLD AUTO: 1 %
BILIRUB SERPL-MCNC: 0.4 MG/DL
BUN SERPL-MCNC: 12.6 MG/DL (ref 6–20)
CALCIUM SERPL-MCNC: 9.7 MG/DL (ref 8.6–10)
CHLORIDE SERPL-SCNC: 102 MMOL/L (ref 98–107)
CHOLEST SERPL-MCNC: 241 MG/DL
CREAT SERPL-MCNC: 0.75 MG/DL (ref 0.51–0.95)
DEPRECATED HCO3 PLAS-SCNC: 29 MMOL/L (ref 22–29)
EGFRCR SERPLBLD CKD-EPI 2021: >90 ML/MIN/1.73M2
EOSINOPHIL # BLD AUTO: 0.3 10E3/UL (ref 0–0.7)
EOSINOPHIL NFR BLD AUTO: 3 %
ERYTHROCYTE [DISTWIDTH] IN BLOOD BY AUTOMATED COUNT: 13.8 % (ref 10–15)
FASTING STATUS PATIENT QL REPORTED: ABNORMAL
FERRITIN SERPL-MCNC: 465 NG/ML (ref 11–328)
FOLATE SERPL-MCNC: 13.9 NG/ML (ref 4.6–34.8)
GLUCOSE SERPL-MCNC: 92 MG/DL (ref 70–99)
HBA1C MFR BLD: 5.6 % (ref 4–6.2)
HCT VFR BLD AUTO: 45.4 % (ref 35–47)
HDLC SERPL-MCNC: 61 MG/DL
HGB BLD-MCNC: 14.8 G/DL (ref 11.7–15.7)
IMM GRANULOCYTES # BLD: 0 10E3/UL
IMM GRANULOCYTES NFR BLD: 0 %
IRON SERPL-MCNC: 63 UG/DL (ref 37–145)
LDLC SERPL CALC-MCNC: 157 MG/DL
LYMPHOCYTES # BLD AUTO: 1.9 10E3/UL (ref 0.8–5.3)
LYMPHOCYTES NFR BLD AUTO: 25 %
MAGNESIUM SERPL-MCNC: 2.1 MG/DL (ref 1.7–2.3)
MCH RBC QN AUTO: 27.8 PG (ref 26.5–33)
MCHC RBC AUTO-ENTMCNC: 32.6 G/DL (ref 31.5–36.5)
MCV RBC AUTO: 85 FL (ref 78–100)
MONOCYTES # BLD AUTO: 0.5 10E3/UL (ref 0–1.3)
MONOCYTES NFR BLD AUTO: 6 %
NEUTROPHILS # BLD AUTO: 5.1 10E3/UL (ref 1.6–8.3)
NEUTROPHILS NFR BLD AUTO: 65 %
NONHDLC SERPL-MCNC: 180 MG/DL
NRBC # BLD AUTO: 0 10E3/UL
NRBC BLD AUTO-RTO: 0 /100
PLATELET # BLD AUTO: 243 10E3/UL (ref 150–450)
POTASSIUM SERPL-SCNC: 4.1 MMOL/L (ref 3.4–5.3)
PROT SERPL-MCNC: 7.7 G/DL (ref 6.4–8.3)
RBC # BLD AUTO: 5.33 10E6/UL (ref 3.8–5.2)
SODIUM SERPL-SCNC: 139 MMOL/L (ref 135–145)
TRIGL SERPL-MCNC: 113 MG/DL
VIT B12 SERPL-MCNC: 912 PG/ML (ref 232–1245)
WBC # BLD AUTO: 7.9 10E3/UL (ref 4–11)

## 2024-02-09 PROCEDURE — 85048 AUTOMATED LEUKOCYTE COUNT: CPT | Mod: ZL | Performed by: FAMILY MEDICINE

## 2024-02-09 PROCEDURE — 80061 LIPID PANEL: CPT | Mod: ZL | Performed by: FAMILY MEDICINE

## 2024-02-09 PROCEDURE — 82728 ASSAY OF FERRITIN: CPT | Mod: ZL | Performed by: FAMILY MEDICINE

## 2024-02-09 PROCEDURE — 83036 HEMOGLOBIN GLYCOSYLATED A1C: CPT | Mod: ZL | Performed by: FAMILY MEDICINE

## 2024-02-09 PROCEDURE — 82306 VITAMIN D 25 HYDROXY: CPT | Mod: ZL | Performed by: FAMILY MEDICINE

## 2024-02-09 PROCEDURE — 82746 ASSAY OF FOLIC ACID SERUM: CPT | Mod: ZL | Performed by: FAMILY MEDICINE

## 2024-02-09 PROCEDURE — 99396 PREV VISIT EST AGE 40-64: CPT | Performed by: FAMILY MEDICINE

## 2024-02-09 PROCEDURE — 87389 HIV-1 AG W/HIV-1&-2 AB AG IA: CPT | Mod: ZL | Performed by: FAMILY MEDICINE

## 2024-02-09 PROCEDURE — 83735 ASSAY OF MAGNESIUM: CPT | Mod: ZL | Performed by: FAMILY MEDICINE

## 2024-02-09 PROCEDURE — 86803 HEPATITIS C AB TEST: CPT | Mod: ZL | Performed by: FAMILY MEDICINE

## 2024-02-09 PROCEDURE — 80053 COMPREHEN METABOLIC PANEL: CPT | Mod: ZL | Performed by: FAMILY MEDICINE

## 2024-02-09 PROCEDURE — 36415 COLL VENOUS BLD VENIPUNCTURE: CPT | Mod: ZL | Performed by: FAMILY MEDICINE

## 2024-02-09 PROCEDURE — 82607 VITAMIN B-12: CPT | Mod: ZL | Performed by: FAMILY MEDICINE

## 2024-02-09 PROCEDURE — 83540 ASSAY OF IRON: CPT | Mod: ZL | Performed by: FAMILY MEDICINE

## 2024-02-09 SDOH — HEALTH STABILITY: PHYSICAL HEALTH: ON AVERAGE, HOW MANY DAYS PER WEEK DO YOU ENGAGE IN MODERATE TO STRENUOUS EXERCISE (LIKE A BRISK WALK)?: 6 DAYS

## 2024-02-09 ASSESSMENT — SOCIAL DETERMINANTS OF HEALTH (SDOH): HOW OFTEN DO YOU GET TOGETHER WITH FRIENDS OR RELATIVES?: ONCE A WEEK

## 2024-02-09 ASSESSMENT — PAIN SCALES - GENERAL: PAINLEVEL: NO PAIN (0)
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
## 2024-02-09 NOTE — NURSING NOTE
"Chief Complaint   Patient presents with    Physical       Initial /76   Pulse 73   Temp 96.9  F (36.1  C) (Tympanic)   Resp 16   Ht 1.683 m (5' 6.25\")   Wt 96.3 kg (212 lb 6 oz)   LMP 03/02/2011   SpO2 98%   BMI 34.02 kg/m   Estimated body mass index is 34.02 kg/m  as calculated from the following:    Height as of this encounter: 1.683 m (5' 6.25\").    Weight as of this encounter: 96.3 kg (212 lb 6 oz).  Medication Review: complete    The next two questions are to help us understand your food security.  If you are feeling you need any assistance in this area, we have resources available to support you today.          2/9/2024   SDOH- Food Insecurity   Within the past 12 months, did you worry that your food would run out before you got money to buy more? N   Within the past 12 months, did the food you bought just not last and you didn t have money to get more? N         Health Care Directive:  Patient does not have a Health Care Directive or Living Will: Discussed advance care planning with patient; information given to patient to review.    Oliva Salazar LPN      "

## 2024-02-10 LAB
HCV AB SERPL QL IA: NONREACTIVE
HIV 1+2 AB+HIV1 P24 AG SERPL QL IA: NONREACTIVE
VIT D+METAB SERPL-MCNC: 42 NG/ML (ref 20–50)

## 2024-04-05 ENCOUNTER — TELEPHONE (OUTPATIENT)
Dept: FAMILY MEDICINE | Facility: OTHER | Age: 59
End: 2024-04-05
Payer: COMMERCIAL

## 2024-04-05 DIAGNOSIS — N81.9 FEMALE GENITAL PROLAPSE, UNSPECIFIED TYPE: Primary | ICD-10-CM

## 2024-04-05 NOTE — TELEPHONE ENCOUNTER
"I am glad to sign the referral if I know what her symptoms/ concerns are. I'm not sure what her \"wall\" means. Pelvic floor issues? Urinary symptoms like bladder prolapse? Uterine prolapse? Vaginal issues?     I tried calling once but she did not answer.     Meron Lowry NP on 4/5/2024 at 1:37 PM    "

## 2024-04-05 NOTE — TELEPHONE ENCOUNTER
SMS-patient states she would like a referral sent to OB GYN to see a provider here at [facility]     Please call and advise    Thank You    Kathy Head on 4/5/2024 at 8:10 AM

## 2024-04-05 NOTE — TELEPHONE ENCOUNTER
Patient states at her physical Dr. Mcguire told her she could see OB/GYN in regards to her wall or something like that. She is requesting a referral be sent. (Nothing noted in OV note about seeing OB/GYN)     Meron North CMA on 4/5/2024 at 11:24 AM

## 2024-04-10 NOTE — TELEPHONE ENCOUNTER
Patient states at her physical Dr. Mcguire stated her pelvic wall was thinning and stated she could meet with OB/GYN to see if there is anything that can be done for it. (Pelvic wall thinning is not a diagnosis- I am not sure what to use as a diagnosis)     Meron North, CMA on 4/10/2024 at 4:11 PM

## 2024-04-16 ENCOUNTER — OFFICE VISIT (OUTPATIENT)
Dept: FAMILY MEDICINE | Facility: OTHER | Age: 59
End: 2024-04-16
Attending: STUDENT IN AN ORGANIZED HEALTH CARE EDUCATION/TRAINING PROGRAM
Payer: COMMERCIAL

## 2024-04-16 VITALS
TEMPERATURE: 99 F | OXYGEN SATURATION: 96 % | WEIGHT: 217.4 LBS | SYSTOLIC BLOOD PRESSURE: 125 MMHG | DIASTOLIC BLOOD PRESSURE: 78 MMHG | HEIGHT: 68 IN | BODY MASS INDEX: 32.95 KG/M2 | RESPIRATION RATE: 16 BRPM | HEART RATE: 67 BPM

## 2024-04-16 DIAGNOSIS — J02.9 ACUTE PHARYNGITIS, UNSPECIFIED ETIOLOGY: Primary | ICD-10-CM

## 2024-04-16 DIAGNOSIS — J02.0 STREPTOCOCCAL PHARYNGITIS: ICD-10-CM

## 2024-04-16 LAB — GROUP A STREP BY PCR: DETECTED

## 2024-04-16 PROCEDURE — 99213 OFFICE O/P EST LOW 20 MIN: CPT

## 2024-04-16 PROCEDURE — 87651 STREP A DNA AMP PROBE: CPT | Mod: ZL

## 2024-04-16 RX ORDER — PENICILLIN V POTASSIUM 500 MG/1
500 TABLET, FILM COATED ORAL 2 TIMES DAILY
Qty: 20 TABLET | Refills: 0 | Status: SHIPPED | OUTPATIENT
Start: 2024-04-16 | End: 2024-04-26

## 2024-04-16 ASSESSMENT — PAIN SCALES - GENERAL: PAINLEVEL: SEVERE PAIN (6)

## 2024-04-16 NOTE — LETTER
[facility]  [address]  [place] [address]  Phone: [phone]  Fax: [phone]    April 16, 2024        Bertha Brand  [address]  [place] [address]          To whom it may concern:    RE: Bertha Brand    Patient was seen and treated today at our clinic and missed work. Please excuse 4/15/24-4/17/24.     Please contact me for questions or concerns.      Sincerely,      DIRK TRACEY
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
## 2024-04-16 NOTE — NURSING NOTE
"Chief Complaint   Patient presents with    Pharyngitis     White in the back of throat       Initial /78 (BP Location: Right arm, Patient Position: Sitting, Cuff Size: Adult Regular)   Pulse 67   Temp 99  F (37.2  C) (Temporal)   Resp 16   Ht 1.727 m (5' 8\")   Wt 98.6 kg (217 lb 6.4 oz)   LMP 03/02/2011   SpO2 96%   BMI 33.06 kg/m   Estimated body mass index is 33.06 kg/m  as calculated from the following:    Height as of this encounter: 1.727 m (5' 8\").    Weight as of this encounter: 98.6 kg (217 lb 6.4 oz).  Medication Review: complete    The next two questions are to help us understand your food security.  If you are feeling you need any assistance in this area, we have resources available to support you today.          4/16/2024   SDOH- Food Insecurity   Within the past 12 months, did you worry that your food would run out before you got money to buy more? N   Within the past 12 months, did the food you bought just not last and you didn t have money to get more? N         Health Care Directive:  Patient does not have a Health Care Directive or Living Will: Discussed advance care planning with patient; however, patient declined at this time.    Navin Wilson      "

## 2024-05-13 ENCOUNTER — OFFICE VISIT (OUTPATIENT)
Dept: OBGYN | Facility: OTHER | Age: 59
End: 2024-05-13
Attending: NURSE PRACTITIONER
Payer: COMMERCIAL

## 2024-05-13 VITALS
WEIGHT: 212 LBS | SYSTOLIC BLOOD PRESSURE: 124 MMHG | BODY MASS INDEX: 32.23 KG/M2 | DIASTOLIC BLOOD PRESSURE: 72 MMHG | HEART RATE: 64 BPM

## 2024-05-13 DIAGNOSIS — N81.9 FEMALE GENITAL PROLAPSE, UNSPECIFIED TYPE: ICD-10-CM

## 2024-05-13 DIAGNOSIS — N81.6 RECTOCELE: Primary | ICD-10-CM

## 2024-05-13 DIAGNOSIS — N81.11 CYSTOCELE, MIDLINE: ICD-10-CM

## 2024-05-13 PROCEDURE — 99203 OFFICE O/P NEW LOW 30 MIN: CPT | Performed by: STUDENT IN AN ORGANIZED HEALTH CARE EDUCATION/TRAINING PROGRAM
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
## 2024-05-13 NOTE — NURSING NOTE
Pt presents to clinic today consult for pelvic tightening.       Medication Reconciliation: complete  Britney Suárez LPN

## 2024-05-14 ENCOUNTER — TRANSCRIBE ORDERS (OUTPATIENT)
Dept: OTHER | Age: 59
End: 2024-05-14

## 2024-05-14 DIAGNOSIS — N81.6 RECTOCELE: ICD-10-CM

## 2024-05-14 DIAGNOSIS — N81.9 FEMALE GENITAL PROLAPSE, UNSPECIFIED TYPE: Primary | ICD-10-CM

## 2024-05-14 DIAGNOSIS — N81.11 CYSTOCELE, MIDLINE: ICD-10-CM

## 2025-01-17 ENCOUNTER — HOSPITAL ENCOUNTER (OUTPATIENT)
Dept: MAMMOGRAPHY | Facility: OTHER | Age: 60
Discharge: HOME | End: 2025-01-17
Attending: FAMILY MEDICINE | Admitting: FAMILY MEDICINE
Payer: COMMERCIAL

## 2025-01-17 DIAGNOSIS — Z12.31 VISIT FOR SCREENING MAMMOGRAM: ICD-10-CM

## 2025-01-17 PROCEDURE — 77063 BREAST TOMOSYNTHESIS BI: CPT

## 2025-01-17 PROCEDURE — 77067 SCR MAMMO BI INCL CAD: CPT

## 2025-03-05 ENCOUNTER — OFFICE VISIT (OUTPATIENT)
Dept: FAMILY MEDICINE | Facility: OTHER | Age: 60
End: 2025-03-05
Attending: FAMILY MEDICINE
Payer: COMMERCIAL

## 2025-03-05 VITALS
RESPIRATION RATE: 18 BRPM | BODY MASS INDEX: 34.08 KG/M2 | SYSTOLIC BLOOD PRESSURE: 130 MMHG | HEART RATE: 58 BPM | TEMPERATURE: 97.4 F | OXYGEN SATURATION: 100 % | WEIGHT: 217.13 LBS | HEIGHT: 67 IN | DIASTOLIC BLOOD PRESSURE: 82 MMHG

## 2025-03-05 DIAGNOSIS — L21.8 SEBORRHEA CORPORIS: ICD-10-CM

## 2025-03-05 DIAGNOSIS — R09.82 PND (POST-NASAL DRIP): ICD-10-CM

## 2025-03-05 DIAGNOSIS — R09.81 NASAL CONGESTION: ICD-10-CM

## 2025-03-05 DIAGNOSIS — Z90.3 S/P GASTRIC SLEEVE PROCEDURE: ICD-10-CM

## 2025-03-05 DIAGNOSIS — Z00.00 ROUTINE HISTORY AND PHYSICAL EXAMINATION OF ADULT: Primary | ICD-10-CM

## 2025-03-05 LAB
ALBUMIN SERPL BCG-MCNC: 4.2 G/DL (ref 3.5–5.2)
ALP SERPL-CCNC: 80 U/L (ref 40–150)
ALT SERPL W P-5'-P-CCNC: 14 U/L (ref 0–50)
ANION GAP SERPL CALCULATED.3IONS-SCNC: 10 MMOL/L (ref 7–15)
AST SERPL W P-5'-P-CCNC: 18 U/L (ref 0–45)
BASOPHILS # BLD AUTO: 0.1 10E3/UL (ref 0–0.2)
BASOPHILS NFR BLD AUTO: 1 %
BILIRUB SERPL-MCNC: 0.3 MG/DL
BUN SERPL-MCNC: 13.1 MG/DL (ref 6–20)
CALCIUM SERPL-MCNC: 9.2 MG/DL (ref 8.8–10.4)
CHLORIDE SERPL-SCNC: 104 MMOL/L (ref 98–107)
CHOLEST SERPL-MCNC: 216 MG/DL
CREAT SERPL-MCNC: 0.75 MG/DL (ref 0.51–0.95)
EGFRCR SERPLBLD CKD-EPI 2021: >90 ML/MIN/1.73M2
EOSINOPHIL # BLD AUTO: 0.2 10E3/UL (ref 0–0.7)
EOSINOPHIL NFR BLD AUTO: 3 %
ERYTHROCYTE [DISTWIDTH] IN BLOOD BY AUTOMATED COUNT: 13.7 % (ref 10–15)
EST. AVERAGE GLUCOSE BLD GHB EST-MCNC: 111 MG/DL
FASTING STATUS PATIENT QL REPORTED: NO
FASTING STATUS PATIENT QL REPORTED: NO
FERRITIN SERPL-MCNC: 382 NG/ML (ref 11–328)
FOLATE SERPL-MCNC: 7.6 NG/ML (ref 4.6–34.8)
GLUCOSE SERPL-MCNC: 85 MG/DL (ref 70–99)
HBA1C MFR BLD: 5.5 %
HCO3 SERPL-SCNC: 26 MMOL/L (ref 22–29)
HCT VFR BLD AUTO: 41.8 % (ref 35–47)
HDLC SERPL-MCNC: 58 MG/DL
HGB BLD-MCNC: 13.9 G/DL (ref 11.7–15.7)
IMM GRANULOCYTES # BLD: 0 10E3/UL
IMM GRANULOCYTES NFR BLD: 0 %
IRON BINDING CAPACITY (ROCHE): 289 UG/DL (ref 240–430)
IRON SATN MFR SERPL: 12 % (ref 15–46)
IRON SERPL-MCNC: 36 UG/DL (ref 37–145)
LDLC SERPL CALC-MCNC: 132 MG/DL
LYMPHOCYTES # BLD AUTO: 2 10E3/UL (ref 0.8–5.3)
LYMPHOCYTES NFR BLD AUTO: 22 %
MCH RBC QN AUTO: 28.4 PG (ref 26.5–33)
MCHC RBC AUTO-ENTMCNC: 33.3 G/DL (ref 31.5–36.5)
MCV RBC AUTO: 85 FL (ref 78–100)
MONOCYTES # BLD AUTO: 0.6 10E3/UL (ref 0–1.3)
MONOCYTES NFR BLD AUTO: 7 %
NEUTROPHILS # BLD AUTO: 6.1 10E3/UL (ref 1.6–8.3)
NEUTROPHILS NFR BLD AUTO: 68 %
NONHDLC SERPL-MCNC: 158 MG/DL
NRBC # BLD AUTO: 0 10E3/UL
NRBC BLD AUTO-RTO: 0 /100
PLATELET # BLD AUTO: 279 10E3/UL (ref 150–450)
POTASSIUM SERPL-SCNC: 4.4 MMOL/L (ref 3.4–5.3)
PROT SERPL-MCNC: 7.7 G/DL (ref 6.4–8.3)
RBC # BLD AUTO: 4.9 10E6/UL (ref 3.8–5.2)
SODIUM SERPL-SCNC: 140 MMOL/L (ref 135–145)
TRIGL SERPL-MCNC: 131 MG/DL
TSH SERPL DL<=0.005 MIU/L-ACNC: 1.93 UIU/ML (ref 0.3–4.2)
VIT B12 SERPL-MCNC: 1700 PG/ML (ref 232–1245)
WBC # BLD AUTO: 9.1 10E3/UL (ref 4–11)

## 2025-03-05 PROCEDURE — 82728 ASSAY OF FERRITIN: CPT | Mod: ZL | Performed by: FAMILY MEDICINE

## 2025-03-05 PROCEDURE — 82306 VITAMIN D 25 HYDROXY: CPT | Mod: ZL | Performed by: FAMILY MEDICINE

## 2025-03-05 PROCEDURE — 85004 AUTOMATED DIFF WBC COUNT: CPT | Mod: ZL | Performed by: FAMILY MEDICINE

## 2025-03-05 PROCEDURE — 36415 COLL VENOUS BLD VENIPUNCTURE: CPT | Mod: ZL | Performed by: FAMILY MEDICINE

## 2025-03-05 PROCEDURE — 84443 ASSAY THYROID STIM HORMONE: CPT | Mod: ZL | Performed by: FAMILY MEDICINE

## 2025-03-05 PROCEDURE — 83718 ASSAY OF LIPOPROTEIN: CPT | Mod: ZL | Performed by: FAMILY MEDICINE

## 2025-03-05 PROCEDURE — 80053 COMPREHEN METABOLIC PANEL: CPT | Mod: ZL | Performed by: FAMILY MEDICINE

## 2025-03-05 PROCEDURE — 82746 ASSAY OF FOLIC ACID SERUM: CPT | Mod: ZL | Performed by: FAMILY MEDICINE

## 2025-03-05 PROCEDURE — 85048 AUTOMATED LEUKOCYTE COUNT: CPT | Mod: ZL | Performed by: FAMILY MEDICINE

## 2025-03-05 PROCEDURE — 83550 IRON BINDING TEST: CPT | Mod: ZL | Performed by: FAMILY MEDICINE

## 2025-03-05 PROCEDURE — 83036 HEMOGLOBIN GLYCOSYLATED A1C: CPT | Mod: ZL | Performed by: FAMILY MEDICINE

## 2025-03-05 PROCEDURE — 82607 VITAMIN B-12: CPT | Mod: ZL | Performed by: FAMILY MEDICINE

## 2025-03-05 RX ORDER — FLUTICASONE PROPIONATE 50 MCG
1 SPRAY, SUSPENSION (ML) NASAL DAILY
Qty: 16 G | Refills: 2 | Status: SHIPPED | OUTPATIENT
Start: 2025-03-05

## 2025-03-05 RX ORDER — CLOTRIMAZOLE AND BETAMETHASONE DIPROPIONATE 10; .64 MG/G; MG/G
CREAM TOPICAL 2 TIMES DAILY
Qty: 45 G | Refills: 1 | Status: SHIPPED | OUTPATIENT
Start: 2025-03-05 | End: 2025-03-12

## 2025-03-05 SDOH — HEALTH STABILITY: PHYSICAL HEALTH: ON AVERAGE, HOW MANY DAYS PER WEEK DO YOU ENGAGE IN MODERATE TO STRENUOUS EXERCISE (LIKE A BRISK WALK)?: 5 DAYS

## 2025-03-05 SDOH — HEALTH STABILITY: PHYSICAL HEALTH: ON AVERAGE, HOW MANY MINUTES DO YOU ENGAGE IN EXERCISE AT THIS LEVEL?: 30 MIN

## 2025-03-05 ASSESSMENT — PAIN SCALES - GENERAL: PAINLEVEL_OUTOF10: NO PAIN (0)

## 2025-03-05 ASSESSMENT — SOCIAL DETERMINANTS OF HEALTH (SDOH): HOW OFTEN DO YOU GET TOGETHER WITH FRIENDS OR RELATIVES?: ONCE A WEEK

## 2025-03-05 NOTE — NURSING NOTE
"Chief Complaint   Patient presents with    Physical       Initial /82   Pulse 58   Temp 97.4  F (36.3  C) (Tympanic)   Resp 18   Ht 1.689 m (5' 6.5\")   Wt 98.5 kg (217 lb 2 oz)   LMP 03/02/2011   SpO2 100%   BMI 34.52 kg/m   Estimated body mass index is 34.52 kg/m  as calculated from the following:    Height as of this encounter: 1.689 m (5' 6.5\").    Weight as of this encounter: 98.5 kg (217 lb 2 oz).  Medication Review: complete    The next two questions are to help us understand your food security.  If you are feeling you need any assistance in this area, we have resources available to support you today.          3/5/2025   SDOH- Food Insecurity   Within the past 12 months, did you worry that your food would run out before you got money to buy more? N   Within the past 12 months, did the food you bought just not last and you didn t have money to get more? N         Health Care Directive:  Patient does not have a Health Care Directive: Discussed advance care planning with patient; information given to patient to review.    Oliva Salazar LPN      "
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
## 2025-03-06 LAB — VIT D+METAB SERPL-MCNC: 47 NG/ML (ref 20–50)

## (undated) RX ORDER — LIDOCAINE HYDROCHLORIDE AND EPINEPHRINE 10; 10 MG/ML; UG/ML
INJECTION, SOLUTION INFILTRATION; PERINEURAL
Status: DISPENSED
Start: 2021-12-30

## (undated) RX ORDER — LIDOCAINE HYDROCHLORIDE 10 MG/ML
INJECTION, SOLUTION INFILTRATION; PERINEURAL
Status: DISPENSED
Start: 2021-12-30